# Patient Record
Sex: FEMALE | Race: OTHER | HISPANIC OR LATINO
[De-identification: names, ages, dates, MRNs, and addresses within clinical notes are randomized per-mention and may not be internally consistent; named-entity substitution may affect disease eponyms.]

---

## 2020-04-06 ENCOUNTER — APPOINTMENT (OUTPATIENT)
Dept: DISASTER EMERGENCY | Facility: CLINIC | Age: 40
End: 2020-04-06
Payer: COMMERCIAL

## 2020-04-06 VITALS
SYSTOLIC BLOOD PRESSURE: 108 MMHG | HEART RATE: 95 BPM | HEIGHT: 59 IN | BODY MASS INDEX: 29.23 KG/M2 | OXYGEN SATURATION: 98 % | RESPIRATION RATE: 16 BRPM | WEIGHT: 145 LBS | TEMPERATURE: 98.5 F | DIASTOLIC BLOOD PRESSURE: 70 MMHG

## 2020-04-06 DIAGNOSIS — R68.89 OTHER GENERAL SYMPTOMS AND SIGNS: ICD-10-CM

## 2020-04-06 DIAGNOSIS — Z20.828 CONTACT WITH AND (SUSPECTED) EXPOSURE TO OTHER VIRAL COMMUNICABLE DISEASES: ICD-10-CM

## 2020-04-06 PROCEDURE — 99202 OFFICE O/P NEW SF 15 MIN: CPT

## 2020-04-06 NOTE — HISTORY OF PRESENT ILLNESS
[Patient presents to the office today for COVID-19 evaluation and testing.] : Patient presents to the office today for COVID-19 evaluation and testing. [Patient has been pre-screened by RN at call center for appointment today with our facility.] : Patient has been pre-screened by RN at call center for appointment today with our facility. 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 Patient told to seek emergent care if symptoms occur. [FreeTextEntry1] : 38 y/o female  with sick contacts. She reports fever x 4 days, cough x 1 week, aches and pains that go to her back. No lightheadedness, dizziness, she can speak in full sentences. No sense of taste or smell. Diarrhea x 3 days. She is drinking fluids. Tylenol last taken at 10 a.m. today.

## 2020-04-07 LAB — SARS-COV-2 N GENE NPH QL NAA+PROBE: DETECTED

## 2021-08-04 ENCOUNTER — NON-APPOINTMENT (OUTPATIENT)
Age: 41
End: 2021-08-04

## 2021-08-04 ENCOUNTER — APPOINTMENT (OUTPATIENT)
Dept: INTERNAL MEDICINE | Facility: CLINIC | Age: 41
End: 2021-08-04
Payer: COMMERCIAL

## 2021-08-04 VITALS
RESPIRATION RATE: 16 BRPM | DIASTOLIC BLOOD PRESSURE: 78 MMHG | HEIGHT: 59 IN | TEMPERATURE: 97.5 F | OXYGEN SATURATION: 98 % | HEART RATE: 98 BPM | BODY MASS INDEX: 29.72 KG/M2 | SYSTOLIC BLOOD PRESSURE: 120 MMHG | WEIGHT: 147.4 LBS

## 2021-08-04 DIAGNOSIS — Z87.2 PERSONAL HISTORY OF DISEASES OF THE SKIN AND SUBCUTANEOUS TISSUE: ICD-10-CM

## 2021-08-04 DIAGNOSIS — Z80.42 FAMILY HISTORY OF MALIGNANT NEOPLASM OF PROSTATE: ICD-10-CM

## 2021-08-04 DIAGNOSIS — Z83.3 FAMILY HISTORY OF DIABETES MELLITUS: ICD-10-CM

## 2021-08-04 DIAGNOSIS — Z80.3 FAMILY HISTORY OF MALIGNANT NEOPLASM OF BREAST: ICD-10-CM

## 2021-08-04 PROCEDURE — 99386 PREV VISIT NEW AGE 40-64: CPT | Mod: 25

## 2021-08-04 PROCEDURE — G0442 ANNUAL ALCOHOL SCREEN 15 MIN: CPT

## 2021-08-04 PROCEDURE — 93000 ELECTROCARDIOGRAM COMPLETE: CPT | Mod: 59

## 2021-08-05 PROBLEM — Z87.2 HISTORY OF CYST OF BREAST: Status: RESOLVED | Noted: 2021-08-04 | Resolved: 2021-08-05

## 2021-08-05 PROBLEM — Z80.42 FAMILY HISTORY OF MALIGNANT NEOPLASM OF PROSTATE: Status: ACTIVE | Noted: 2021-08-04

## 2021-08-05 PROBLEM — Z80.3 FAMILY HISTORY OF MALIGNANT NEOPLASM OF BREAST: Status: ACTIVE | Noted: 2021-08-04

## 2021-08-05 PROBLEM — Z83.3 FAMILY HISTORY OF DIABETES MELLITUS: Status: ACTIVE | Noted: 2021-08-04

## 2021-08-05 NOTE — REVIEW OF SYSTEMS
[Negative] : Heme/Lymph [Itching] : Itching [Skin Rash] : skin rash [de-identified] : rash of lower extremities and back

## 2021-08-05 NOTE — HISTORY OF PRESENT ILLNESS
[FreeTextEntry1] : new patient annual wellness visit [de-identified] : New patient is a 40 year old female with a past medical history as below who presents for an annual wellness visit. Patient is not currently taking any prescription medications. Patient is taking OTC [place] which she states has helped to better regulate her menstrual cycles. Patient has not seen a GYN in the past year. She sees breast surgeon, Dr. Walker given history of breast cyst excision. She is up-to-date with annual breast imaging. Patient notes an itchy rash (lower extremities, back) that she first observed approximately 3 weeks ago. She denies using any new perfumes, creams, soaps, shampoos, or detergents. She denies recently purchasing new clothing. She notes seeing dermatologist, Dr. Pena who prescribed a topical cream that did not help alleviate the pruritus or rash. Patient did not receive the flu vaccine for this past season. She is unsure if she has received the Tetanus Vaccine in the past 10 years. She has received both doses of the COVID-19 Vaccine. Patient is not fasting today.

## 2021-08-05 NOTE — ADDENDUM
[FreeTextEntry1] : I, Mohsen Mclaughlin, acted solely as scribe for Dr. See Mead DO on this date 08/04/2021 12:40PM .\par \par All medical record entries made by the Scribe were at my, Dr. See Mead DO direction and personally dictated by me on 08/04/2021 12:40PM. I have reviewed the chart and agree that the record accurately reflects my personal performance of the history, physical exam, assessment and plan. I have also personally directed, reviewed and agreed with the chart.\par

## 2021-08-05 NOTE — PLAN
[FreeTextEntry1] : Gynecology\par Patient to be referred to GYN during next visit; Results of most recent breast imaging to be requested from [facility] \par history of breast cyst excision - continue to follow up with breast surgeon, Dr. Walker\par Dermatology\par dermatitis - start Prednisone 5mg p.o. as directed, Rx filled - recommended following up with dermatologist, Dr. Malik for further evaluation/treatment if rash persists/worsens after 1 week, consider checking Immunocap allergy panel with blood work up if rash still active\par Immunization\par flu vaccine - recommended following up between September and November 2021 for vaccine administration prior to start of next flu season\par \par check EKG (results as above)\par Patient to follow up for fasting blood work in 1 month.

## 2021-08-05 NOTE — ASSESSMENT
[FreeTextEntry1] : New patient is a 40 year old female with a past medical history as above who presents for an annual wellness visit.\par

## 2021-08-05 NOTE — HEALTH RISK ASSESSMENT
[No] : In the past 12 months have you used drugs other than those required for medical reasons? No [0] : 2) Feeling down, depressed, or hopeless: Not at all (0) [PHQ-2 Negative - No further assessment needed] : PHQ-2 Negative - No further assessment needed [] : No [Audit-CScore] : 0 [[id number]] : 0 [MammogramComments] : Results to be requested from [facility].  [PapSmearComments] : Patient to be referred to GYN during next visit.

## 2021-08-05 NOTE — PHYSICAL EXAM
[No Acute Distress] : no acute distress [Well Nourished] : well nourished [Well Developed] : well developed [Well-Appearing] : well-appearing [Normal Sclera/Conjunctiva] : normal sclera/conjunctiva [PERRL] : pupils equal round and reactive to light [EOMI] : extraocular movements intact [Normal Outer Ear/Nose] : the outer ears and nose were normal in appearance [Normal Oropharynx] : the oropharynx was normal [No JVD] : no jugular venous distention [No Lymphadenopathy] : no lymphadenopathy [Supple] : supple [Thyroid Normal, No Nodules] : the thyroid was normal and there were no nodules present [No Respiratory Distress] : no respiratory distress  [No Accessory Muscle Use] : no accessory muscle use [Clear to Auscultation] : lungs were clear to auscultation bilaterally [Normal Rate] : normal rate  [Regular Rhythm] : with a regular rhythm [Normal S1, S2] : normal S1 and S2 [No Murmur] : no murmur heard [No Carotid Bruits] : no carotid bruits [No Abdominal Bruit] : a ~M bruit was not heard ~T in the abdomen [No Varicosities] : no varicosities [Pedal Pulses Present] : the pedal pulses are present [No Edema] : there was no peripheral edema [No Palpable Aorta] : no palpable aorta [No Extremity Clubbing/Cyanosis] : no extremity clubbing/cyanosis [Soft] : abdomen soft [Non Tender] : non-tender [Non-distended] : non-distended [No Masses] : no abdominal mass palpated [No HSM] : no HSM [Normal Bowel Sounds] : normal bowel sounds [Normal Posterior Cervical Nodes] : no posterior cervical lymphadenopathy [Normal Anterior Cervical Nodes] : no anterior cervical lymphadenopathy [No CVA Tenderness] : no CVA  tenderness [No Spinal Tenderness] : no spinal tenderness [No Joint Swelling] : no joint swelling [Grossly Normal Strength/Tone] : grossly normal strength/tone [Coordination Grossly Intact] : coordination grossly intact [No Focal Deficits] : no focal deficits [Normal Gait] : normal gait [Deep Tendon Reflexes (DTR)] : deep tendon reflexes were 2+ and symmetric [Normal Affect] : the affect was normal [Normal Insight/Judgement] : insight and judgment were intact [de-identified] : overweight  [de-identified] : multiple 1 cm by 1 cm red papules on quads bilaterally, diffuse on back

## 2021-10-13 ENCOUNTER — APPOINTMENT (OUTPATIENT)
Dept: INTERNAL MEDICINE | Facility: CLINIC | Age: 41
End: 2021-10-13
Payer: COMMERCIAL

## 2021-10-13 VITALS
HEIGHT: 59 IN | RESPIRATION RATE: 16 BRPM | OXYGEN SATURATION: 83 % | HEART RATE: 98 BPM | WEIGHT: 147.5 LBS | DIASTOLIC BLOOD PRESSURE: 62 MMHG | BODY MASS INDEX: 29.73 KG/M2 | SYSTOLIC BLOOD PRESSURE: 100 MMHG | TEMPERATURE: 97.3 F

## 2021-10-13 DIAGNOSIS — L28.2 OTHER PRURIGO: ICD-10-CM

## 2021-10-13 DIAGNOSIS — L30.9 DERMATITIS, UNSPECIFIED: ICD-10-CM

## 2021-10-13 PROCEDURE — 36415 COLL VENOUS BLD VENIPUNCTURE: CPT

## 2021-10-13 PROCEDURE — 99213 OFFICE O/P EST LOW 20 MIN: CPT | Mod: 25

## 2021-10-13 PROCEDURE — 90686 IIV4 VACC NO PRSV 0.5 ML IM: CPT

## 2021-10-13 PROCEDURE — G0008: CPT

## 2021-10-13 NOTE — HEALTH RISK ASSESSMENT
[No] : In the past 12 months have you used drugs other than those required for medical reasons? No [0] : 2) Feeling down, depressed, or hopeless: Not at all (0) [PHQ-2 Negative - No further assessment needed] : PHQ-2 Negative - No further assessment needed [] : No [Audit-CScore] : 0 [[id number]] : 0 [MammogramComments] : Breast US: BI-RADS 2 - Benign findings.

## 2021-10-13 NOTE — PHYSICAL EXAM
[No Acute Distress] : no acute distress [Well Nourished] : well nourished [Well Developed] : well developed [Well-Appearing] : well-appearing [Normal Sclera/Conjunctiva] : normal sclera/conjunctiva [PERRL] : pupils equal round and reactive to light [EOMI] : extraocular movements intact [Normal Outer Ear/Nose] : the outer ears and nose were normal in appearance [Normal Oropharynx] : the oropharynx was normal [No JVD] : no jugular venous distention [No Lymphadenopathy] : no lymphadenopathy [Supple] : supple [Thyroid Normal, No Nodules] : the thyroid was normal and there were no nodules present [No Respiratory Distress] : no respiratory distress  [No Accessory Muscle Use] : no accessory muscle use [Clear to Auscultation] : lungs were clear to auscultation bilaterally [Normal Rate] : normal rate  [Regular Rhythm] : with a regular rhythm [Normal S1, S2] : normal S1 and S2 [No Murmur] : no murmur heard [No Carotid Bruits] : no carotid bruits [No Abdominal Bruit] : a ~M bruit was not heard ~T in the abdomen [No Varicosities] : no varicosities [Pedal Pulses Present] : the pedal pulses are present [No Edema] : there was no peripheral edema [No Palpable Aorta] : no palpable aorta [No Extremity Clubbing/Cyanosis] : no extremity clubbing/cyanosis [Soft] : abdomen soft [Non Tender] : non-tender [Non-distended] : non-distended [No Masses] : no abdominal mass palpated [No HSM] : no HSM [Normal Bowel Sounds] : normal bowel sounds [Normal Posterior Cervical Nodes] : no posterior cervical lymphadenopathy [Normal Anterior Cervical Nodes] : no anterior cervical lymphadenopathy [No CVA Tenderness] : no CVA  tenderness [No Spinal Tenderness] : no spinal tenderness [No Joint Swelling] : no joint swelling [Grossly Normal Strength/Tone] : grossly normal strength/tone [No Rash] : no rash [Coordination Grossly Intact] : coordination grossly intact [No Focal Deficits] : no focal deficits [Normal Gait] : normal gait [Deep Tendon Reflexes (DTR)] : deep tendon reflexes were 2+ and symmetric [Normal Affect] : the affect was normal [Normal Insight/Judgement] : insight and judgment were intact [de-identified] : overweight

## 2021-10-13 NOTE — HISTORY OF PRESENT ILLNESS
[FreeTextEntry1] : fasting blood work and general follow-up\par  [de-identified] : Patient is a 40 year old female with a past medical history as below who presents for fasting blood work and general follow-up. Blood work is being done today as patient was not fasting during her annual wellness visit on 8/4/21. Patient states rash resolved after completing course of oral Prednisone. Patient inquires about receiving the flu vaccine today. She has received both doses of the COVID-19 Vaccine (Pfizer).

## 2021-10-13 NOTE — PLAN
[FreeTextEntry1] : Gynecology\par history of breast cyst excision - continue to follow up with breast surgeon, Dr. Walker\par Immunization\par flu vaccine - Fluzone Quadrivalent 0.5mL x 1 administered intramuscularly to left deltoid \par \par check female panel and hemoglobin A1C

## 2021-10-13 NOTE — ADDENDUM
[FreeTextEntry1] : I, Mohsen Mclaughlin, acted solely as scribe for Dr. See Mead DO on this date 10/13/2021  8:50AM .\par \par All medical record entries made by the Scribe were at my, Dr. See Mead DO direction and personally dictated by me on 10/13/2021  8:50AM. I have reviewed the chart and agree that the record accurately reflects my personal performance of the history, physical exam, assessment and plan. I have also personally directed, reviewed and agreed with the chart.\par

## 2021-10-13 NOTE — ASSESSMENT
[FreeTextEntry1] : Patient is a 40 year old female with a past medical history as above who presents for fasting blood work and general follow-up.\par

## 2021-10-23 ENCOUNTER — NON-APPOINTMENT (OUTPATIENT)
Age: 41
End: 2021-10-23

## 2021-10-23 LAB
25(OH)D3 SERPL-MCNC: 18.1 NG/ML
ALBUMIN SERPL ELPH-MCNC: 4.4 G/DL
ALP BLD-CCNC: 124 U/L
ALT SERPL-CCNC: 32 U/L
ANION GAP SERPL CALC-SCNC: 14 MMOL/L
AST SERPL-CCNC: 29 U/L
BASOPHILS # BLD AUTO: 0.04 K/UL
BASOPHILS NFR BLD AUTO: 0.6 %
BILIRUB SERPL-MCNC: 0.5 MG/DL
BUN SERPL-MCNC: 14 MG/DL
CALCIUM SERPL-MCNC: 9.6 MG/DL
CHLORIDE SERPL-SCNC: 102 MMOL/L
CHOLEST SERPL-MCNC: 163 MG/DL
CO2 SERPL-SCNC: 24 MMOL/L
CREAT SERPL-MCNC: 0.55 MG/DL
EOSINOPHIL # BLD AUTO: 0.11 K/UL
EOSINOPHIL NFR BLD AUTO: 1.7 %
ESTIMATED AVERAGE GLUCOSE: 120 MG/DL
GLUCOSE SERPL-MCNC: 92 MG/DL
HBA1C MFR BLD HPLC: 5.8 %
HCT VFR BLD CALC: 42 %
HDLC SERPL-MCNC: 36 MG/DL
HGB BLD-MCNC: 12.8 G/DL
IMM GRANULOCYTES NFR BLD AUTO: 0.3 %
LDLC SERPL CALC-MCNC: 93 MG/DL
LYMPHOCYTES # BLD AUTO: 2.39 K/UL
LYMPHOCYTES NFR BLD AUTO: 37.2 %
MAN DIFF?: NORMAL
MCHC RBC-ENTMCNC: 26.6 PG
MCHC RBC-ENTMCNC: 30.5 GM/DL
MCV RBC AUTO: 87.1 FL
MONOCYTES # BLD AUTO: 0.47 K/UL
MONOCYTES NFR BLD AUTO: 7.3 %
NEUTROPHILS # BLD AUTO: 3.39 K/UL
NEUTROPHILS NFR BLD AUTO: 52.9 %
NONHDLC SERPL-MCNC: 127 MG/DL
PLATELET # BLD AUTO: 305 K/UL
POTASSIUM SERPL-SCNC: 4.2 MMOL/L
PROT SERPL-MCNC: 7.3 G/DL
RBC # BLD: 4.82 M/UL
RBC # FLD: 14.6 %
SODIUM SERPL-SCNC: 140 MMOL/L
TRIGL SERPL-MCNC: 167 MG/DL
TSH SERPL-ACNC: <0.01 UIU/ML
WBC # FLD AUTO: 6.42 K/UL

## 2021-11-15 ENCOUNTER — RESULT REVIEW (OUTPATIENT)
Age: 41
End: 2021-11-15

## 2021-11-15 ENCOUNTER — LABORATORY RESULT (OUTPATIENT)
Age: 41
End: 2021-11-15

## 2021-11-15 ENCOUNTER — APPOINTMENT (OUTPATIENT)
Dept: INTERNAL MEDICINE | Facility: CLINIC | Age: 41
End: 2021-11-15
Payer: COMMERCIAL

## 2021-11-15 VITALS
BODY MASS INDEX: 29.69 KG/M2 | SYSTOLIC BLOOD PRESSURE: 106 MMHG | TEMPERATURE: 98.1 F | DIASTOLIC BLOOD PRESSURE: 70 MMHG | WEIGHT: 147 LBS | OXYGEN SATURATION: 80 % | RESPIRATION RATE: 16 BRPM | HEART RATE: 98 BPM

## 2021-11-15 DIAGNOSIS — R74.8 ABNORMAL LEVELS OF OTHER SERUM ENZYMES: ICD-10-CM

## 2021-11-15 PROCEDURE — 36415 COLL VENOUS BLD VENIPUNCTURE: CPT

## 2021-11-15 PROCEDURE — 99214 OFFICE O/P EST MOD 30 MIN: CPT | Mod: 25

## 2021-11-15 NOTE — ADDENDUM
[FreeTextEntry1] : I, Mohsen Mclaughlin, acted solely as scribe for Dr. See Mead DO on this date 11/15/2021  3:20PM .\par \par All medical record entries made by the Scribe were at my, Dr. See Mead DO direction and personally dictated by me on 11/15/2021  3:20PM. I have reviewed the chart and agree that the record accurately reflects my personal performance of the history, physical exam, assessment and plan. I have also personally directed, reviewed and agreed with the chart.\par

## 2021-11-15 NOTE — REVIEW OF SYSTEMS
[Palpitations] : palpitations [Hair Changes] : hair changes [Negative] : Heme/Lymph [Joint Pain] : joint pain [FreeTextEntry9] : left wrist pain  [de-identified] : hair loss

## 2021-11-15 NOTE — PHYSICAL EXAM
[No Acute Distress] : no acute distress [Well Nourished] : well nourished [Well Developed] : well developed [Well-Appearing] : well-appearing [Normal Sclera/Conjunctiva] : normal sclera/conjunctiva [PERRL] : pupils equal round and reactive to light [EOMI] : extraocular movements intact [Normal Outer Ear/Nose] : the outer ears and nose were normal in appearance [Normal Oropharynx] : the oropharynx was normal [No JVD] : no jugular venous distention [No Lymphadenopathy] : no lymphadenopathy [Supple] : supple [Thyroid Normal, No Nodules] : the thyroid was normal and there were no nodules present [No Respiratory Distress] : no respiratory distress  [No Accessory Muscle Use] : no accessory muscle use [Clear to Auscultation] : lungs were clear to auscultation bilaterally [Normal Rate] : normal rate  [Regular Rhythm] : with a regular rhythm [Normal S1, S2] : normal S1 and S2 [No Murmur] : no murmur heard [No Carotid Bruits] : no carotid bruits [No Abdominal Bruit] : a ~M bruit was not heard ~T in the abdomen [No Varicosities] : no varicosities [Pedal Pulses Present] : the pedal pulses are present [No Edema] : there was no peripheral edema [No Palpable Aorta] : no palpable aorta [No Extremity Clubbing/Cyanosis] : no extremity clubbing/cyanosis [Soft] : abdomen soft [Non Tender] : non-tender [Non-distended] : non-distended [No Masses] : no abdominal mass palpated [No HSM] : no HSM [Normal Bowel Sounds] : normal bowel sounds [Normal Posterior Cervical Nodes] : no posterior cervical lymphadenopathy [Normal Anterior Cervical Nodes] : no anterior cervical lymphadenopathy [No CVA Tenderness] : no CVA  tenderness [No Spinal Tenderness] : no spinal tenderness [No Joint Swelling] : no joint swelling [No Rash] : no rash [Coordination Grossly Intact] : coordination grossly intact [No Focal Deficits] : no focal deficits [Normal Gait] : normal gait [Deep Tendon Reflexes (DTR)] : deep tendon reflexes were 2+ and symmetric [Normal Affect] : the affect was normal [Normal Insight/Judgement] : insight and judgment were intact [de-identified] : overweight  [de-identified] : tenderness over ulnar aspect of left wrist

## 2021-11-15 NOTE — PLAN
[FreeTextEntry1] : Gastroenterology\par elevated ALK PHOS - check ALK PHOS Isoenzyme Measure and Serum Gamma Glutamyl Transferase\par Endocrinology\par low TSH - possible secondary to hyperthyroidism - check thyroid panel; if TFTs confirm hyperthyroidism, will refer patient to endocrinology for further treatment \par hyperglycemia (prediabetes) - advised low carbohydrate/low sugar diet; again recommended increasing CV exercise - HGB A1C to be rechecked in 3 months\par Vitamin D deficiency - previously advised to start Vitamin D-3 4000 IU p.o.q.d. with meals for 1 month and then decrease dosage to 2000 IU daily thereafter - Vitamin D level to be rechecked in 3 months \par Cardiology\par mild hypertriglyceridemia - advised low cholesterol/low fat and low carbohydrate/low sugar diet - FLP to be rechecked in 3 months\par low HDL - again recommended increasing CV exercise - FLP to be rechecked in 3 months\par Gynecology\par history of breast cyst excision - continue to follow up with breast surgeon, Dr. Walker\par Musculoskeletal\par left wrist pain - Rx given for X-Ray Left Wrist; recommended following up at a [facility] facility\par \par check ALK PHOS Isoenzyme Measure, Serum Gamma Glutamyl Transferase, and thyroid panel\par Repeat fasting blood work to be done in 3 months.

## 2021-11-15 NOTE — ASSESSMENT
[FreeTextEntry1] : Patient is a 40 year old female with a past medical history as above who presents for non-fasting blood work and general follow-up.

## 2021-11-15 NOTE — HEALTH RISK ASSESSMENT
[No] : In the past 12 months have you used drugs other than those required for medical reasons? No [0] : 2) Feeling down, depressed, or hopeless: Not at all (0) [PHQ-2 Negative - No further assessment needed] : PHQ-2 Negative - No further assessment needed [] : No [Audit-CScore] : 0 [[id number]] : 0 [MammogramComments] : Breast US: BI-RADS 2 - Benign findings.

## 2021-11-15 NOTE — HISTORY OF PRESENT ILLNESS
[FreeTextEntry1] : non-fasting blood work and general follow-up [de-identified] : Patient is a 40 year old female with a past medical history as below who presents for non-fasting blood work and general follow-up. \par Blood work done on 10/13/21 revealed elevated ALK PHOS (124), low TSH (<0.01), elevated triglycerides (167), low HDL (36), elevated HGB A1C (5.8), and low Vitamin D (18.1). Patient has been taking OTC Vitamin D-3 daily. Patient intermittently notes palpitations. She has noted hair loss. She denies increased diaphoresis or diarrhea. Patient notes left wrist pain. She denies any recent falls or trauma to the area.

## 2021-11-21 ENCOUNTER — NON-APPOINTMENT (OUTPATIENT)
Age: 41
End: 2021-11-21

## 2021-11-21 LAB
ALP BLD-CCNC: 116 IU/L
ALP BONE CFR SERPL: 44 %
ALP INTEST CFR SERPL: 9 %
ALP LIVER CFR SERPL: 47 %
GGT SERPL-CCNC: 22 U/L
T3 SERPL-MCNC: 118 NG/DL
T3FREE SERPL-MCNC: 3.32 PG/ML
T3RU NFR SERPL: 1.1 TBI
T4 FREE SERPL-MCNC: 1 NG/DL
T4 SERPL-MCNC: 5.8 UG/DL
THYROGLOB AB SERPL-ACNC: 25.8 IU/ML
THYROPEROXIDASE AB SERPL IA-ACNC: 119 IU/ML
TSH SERPL-ACNC: 0.03 UIU/ML

## 2021-12-13 ENCOUNTER — APPOINTMENT (OUTPATIENT)
Dept: INTERNAL MEDICINE | Facility: CLINIC | Age: 41
End: 2021-12-13
Payer: COMMERCIAL

## 2021-12-13 VITALS
BODY MASS INDEX: 29.64 KG/M2 | RESPIRATION RATE: 16 BRPM | OXYGEN SATURATION: 98 % | DIASTOLIC BLOOD PRESSURE: 70 MMHG | HEIGHT: 59 IN | HEART RATE: 98 BPM | TEMPERATURE: 98.7 F | WEIGHT: 147 LBS | SYSTOLIC BLOOD PRESSURE: 110 MMHG

## 2021-12-13 PROCEDURE — 99213 OFFICE O/P EST LOW 20 MIN: CPT

## 2021-12-13 NOTE — PLAN
[FreeTextEntry1] : Musculoskeletal\par left wrist pain - Rx given for X-Ray Left Wrist; recommended following up at a [facility] facility - start Meloxicam 7.5mg BID p.o. p.r.n. with food as directed, Rx filled - referred to orthopedist, Dr. Barahona for further evaluation/treatment \par pain in thumb joint with movement of right hand - Rx given for X-Ray Right Hand; recommended following up at a [facility] facility - start Meloxicam 7.5mg BID p.o. p.r.n. with food as directed, Rx filled - referred to orthopedist, Dr. Barahona for further evaluation/treatment \par Endocrinology\par low TSH - possible secondary to hyperthyroidism - patient to see endocrinologist, Dr. Guzman on 2/15/22 \par hyperglycemia (prediabetes) - continue low carbohydrate/low sugar diet; previously recommended increasing CV exercise \par Vitamin D deficiency - previously advised to start Vitamin D-3 4000 IU p.o.q.d. with meals for 1 month and then decrease dosage to 2000 IU daily thereafter \par Cardiology\par mild hypertriglyceridemia - continue low cholesterol/low fat and low carbohydrate/low sugar diet \par low HDL - previously recommended increasing CV exercise \par Gynecology\par history of breast cyst excision - continue to follow up with breast surgeon, Dr. Walker\par \par Repeat fasting blood work to be done in 2 months.

## 2021-12-13 NOTE — HEALTH RISK ASSESSMENT
[Never] : Never [No] : In the past 12 months have you used drugs other than those required for medical reasons? No [0] : 2) Feeling down, depressed, or hopeless: Not at all (0) [PHQ-2 Negative - No further assessment needed] : PHQ-2 Negative - No further assessment needed [Audit-CScore] : 0 [[id number]] : 0 [MammogramComments] : Breast US (3/19/21): BI-RADS 2 - Benign findings.

## 2021-12-13 NOTE — REVIEW OF SYSTEMS
[Joint Pain] : joint pain [Negative] : Heme/Lymph [FreeTextEntry9] : left wrist pain; pain in right thumb joint with movement of right hand

## 2021-12-13 NOTE — ADDENDUM
[FreeTextEntry1] : I, Mohsen Mclaughlin, acted solely as scribe for Dr. See Mead DO on this date 12/13/2021 10:30AM .\par \par All medical record entries made by the Scribe were at my, Dr. See Mead DO direction and personally dictated by me on 12/13/2021 10:30AM. I have reviewed the chart and agree that the record accurately reflects my personal performance of the history, physical exam, assessment and plan. I have also personally directed, reviewed and agreed with the chart.\par

## 2021-12-13 NOTE — PHYSICAL EXAM
[No Acute Distress] : no acute distress [Well Nourished] : well nourished [Well Developed] : well developed [Well-Appearing] : well-appearing [Normal Sclera/Conjunctiva] : normal sclera/conjunctiva [PERRL] : pupils equal round and reactive to light [EOMI] : extraocular movements intact [Normal Outer Ear/Nose] : the outer ears and nose were normal in appearance [Normal Oropharynx] : the oropharynx was normal [No JVD] : no jugular venous distention [No Lymphadenopathy] : no lymphadenopathy [Supple] : supple [Thyroid Normal, No Nodules] : the thyroid was normal and there were no nodules present [No Respiratory Distress] : no respiratory distress  [No Accessory Muscle Use] : no accessory muscle use [Clear to Auscultation] : lungs were clear to auscultation bilaterally [Normal Rate] : normal rate  [Regular Rhythm] : with a regular rhythm [Normal S1, S2] : normal S1 and S2 [No Murmur] : no murmur heard [No Carotid Bruits] : no carotid bruits [No Abdominal Bruit] : a ~M bruit was not heard ~T in the abdomen [No Varicosities] : no varicosities [Pedal Pulses Present] : the pedal pulses are present [No Edema] : there was no peripheral edema [No Palpable Aorta] : no palpable aorta [No Extremity Clubbing/Cyanosis] : no extremity clubbing/cyanosis [Soft] : abdomen soft [Non Tender] : non-tender [Non-distended] : non-distended [No Masses] : no abdominal mass palpated [No HSM] : no HSM [Normal Bowel Sounds] : normal bowel sounds [Normal Posterior Cervical Nodes] : no posterior cervical lymphadenopathy [Normal Anterior Cervical Nodes] : no anterior cervical lymphadenopathy [No CVA Tenderness] : no CVA  tenderness [No Spinal Tenderness] : no spinal tenderness [No Joint Swelling] : no joint swelling [Grossly Normal Strength/Tone] : grossly normal strength/tone [No Rash] : no rash [Coordination Grossly Intact] : coordination grossly intact [No Focal Deficits] : no focal deficits [Normal Gait] : normal gait [Deep Tendon Reflexes (DTR)] : deep tendon reflexes were 2+ and symmetric [Normal Affect] : the affect was normal [Normal Insight/Judgement] : insight and judgment were intact [de-identified] : overweight

## 2021-12-13 NOTE — ASSESSMENT
[FreeTextEntry1] : Patient is a 40 year old female with a past medical history as above who presents for left wrist pain and pain in thumb joint with movement of right hand.

## 2021-12-13 NOTE — HISTORY OF PRESENT ILLNESS
[FreeTextEntry8] : Patient is a 40 year old female with a past medical history as below who presents for persistent left wrist pain which she first noted approximately 1 month ago. She denies any falls or trauma to the area prior to onset. Patient also notes pain in the right thumb joint with movement of her right hand. She denies any recent trauma to the area.\par

## 2021-12-21 ENCOUNTER — OUTPATIENT (OUTPATIENT)
Dept: OUTPATIENT SERVICES | Facility: HOSPITAL | Age: 41
LOS: 1 days | End: 2021-12-21
Payer: COMMERCIAL

## 2021-12-21 ENCOUNTER — APPOINTMENT (OUTPATIENT)
Dept: RADIOLOGY | Facility: CLINIC | Age: 41
End: 2021-12-21
Payer: COMMERCIAL

## 2021-12-21 DIAGNOSIS — M25.541 PAIN IN JOINTS OF RIGHT HAND: ICD-10-CM

## 2021-12-21 DIAGNOSIS — T14.90 INJURY, UNSPECIFIED: Chronic | ICD-10-CM

## 2021-12-21 PROCEDURE — 73130 X-RAY EXAM OF HAND: CPT | Mod: 26,RT

## 2021-12-21 PROCEDURE — 73110 X-RAY EXAM OF WRIST: CPT | Mod: 26,LT

## 2021-12-21 PROCEDURE — 73130 X-RAY EXAM OF HAND: CPT

## 2021-12-21 PROCEDURE — 73110 X-RAY EXAM OF WRIST: CPT

## 2022-01-05 ENCOUNTER — APPOINTMENT (OUTPATIENT)
Dept: ORTHOPEDIC SURGERY | Facility: CLINIC | Age: 42
End: 2022-01-05
Payer: COMMERCIAL

## 2022-01-05 ENCOUNTER — NON-APPOINTMENT (OUTPATIENT)
Age: 42
End: 2022-01-05

## 2022-01-05 VITALS
WEIGHT: 145 LBS | DIASTOLIC BLOOD PRESSURE: 67 MMHG | SYSTOLIC BLOOD PRESSURE: 102 MMHG | HEIGHT: 60 IN | HEART RATE: 90 BPM | BODY MASS INDEX: 28.47 KG/M2

## 2022-01-05 PROCEDURE — 20550 NJX 1 TENDON SHEATH/LIGAMENT: CPT | Mod: F5

## 2022-01-05 PROCEDURE — 99203 OFFICE O/P NEW LOW 30 MIN: CPT | Mod: 25

## 2022-01-05 RX ORDER — LIDOCAINE HYDROCHLORIDE 10 MG/ML
1 INJECTION, SOLUTION INFILTRATION; PERINEURAL
Refills: 0 | Status: COMPLETED | OUTPATIENT
Start: 2022-01-05

## 2022-01-05 RX ORDER — BETAMETHA AC,SOD PHOS/WATER/PF 6 MG/ML
6 (3-3) VIAL (ML) INJECTION
Qty: 1 | Refills: 0 | Status: COMPLETED | OUTPATIENT
Start: 2022-01-05

## 2022-01-05 RX ADMIN — LIDOCAINE HYDROCHLORIDE 0.5 %: 10 INJECTION, SOLUTION INFILTRATION; PERINEURAL at 00:00

## 2022-01-05 RX ADMIN — BETAMETHASONE SODIUM PHOSPHATE AND BETAMETHASONE ACETATE 1 MG/ML: 3; 3 INJECTION, SUSPENSION INTRA-ARTICULAR; INTRALESIONAL; INTRAMUSCULAR; SOFT TISSUE at 00:00

## 2022-01-05 NOTE — PHYSICAL EXAM
[de-identified] : - Constitutional: This is a female in no obvious distress.  \par - Psych: Patient is alert and oriented to person, place and time.  Patient has a normal mood and affect.\par - Cardiovascular: Normal pulses throughout the upper extremities.  No significant varicosities are noted in the upper extremities. \par - Neuro: Strength and sensation are intact throughout the upper extremities.  Patient has normal coordination.\par - Respiratory:  Patient exhibits no evidence of shortness of breath or difficulty breathing.\par - Skin: No rashes, lesions, or other abnormalities are noted in the upper extremities.\par \par --- \par \par Examination of her right thumb demonstrates swelling and tenderness along the A1 pulley per there is no obvious triggering, as she cannot flex to the point of triggering.  There is no triggering of the other digits.  She is neurovascularly intact distally.\par \par Examination of her left wrist demonstrates no obvious swelling.  She is tender dorsally and ulnarly.  There is mild tenderness along the ulnar styloid as well as along the ECU tendon.  There is no localized swelling or tenderness along the ulnocarpal joint.  There is no instability.  She is neurovascularly intact distally. [de-identified] : I reviewed radiographs of her right hand and left wrist from 12/21/21. These demonstrated no fractures or dislocations and preserved joint spaces and no joint margin erosions.

## 2022-01-05 NOTE — PROCEDURE
[FreeTextEntry1] : -  After a discussion of risks and benefits, the patient agreed to proceed with a cortisone injection.  \par -  Side: Right\par -  Finger: Thumb trigger finger\par -  Medications: 0.5 cc of 1% Lidocaine and 1 cc of betamethasone, using sterile technique.\par -  Patient tolerated the procedure well, without complications.\par -  Patient was told that the symptoms may worsen for a day or two, and should then begin to improve. \par -  Instructions: Patient was instructed on activity modification for the next several days.\par -  Follow-up: Within 4 weeks to assess response to the injection.

## 2022-01-05 NOTE — END OF VISIT
[FreeTextEntry3] : This note was written by Jailene Mobley on 01/05/2022 acting solely as a scribe for Dr. Jackson Barahona.\par  \par All medical record entries made by the Scribe were at my, Dr. Jackson Barahona, direction and personally dictated by me on 01/05/2022. I have personally reviewed the chart and agree that the record accurately reflects my personal performance of the history, physical exam, assessment and plan.

## 2022-01-05 NOTE — DISCUSSION/SUMMARY
[FreeTextEntry1] : She has findings consistent with right thumb trigger finger and left wrist pain secondary to either ECU tendinitis versus inflammation in the region of the DRUJ, dorsal ulnocarpal joint.\par \par I had a discussion with the patient regarding today's visit, the prognosis of this diagnosis, and treatment recommendations and options. With regard to the right thumb, she has agreed to proceed with a cortisone injection to the flexor tendon sheath.\par \par With regard to the left wrist, she was fitted with a left wrist splint.  We will reevaluate her progress when she returns in 4 weeks.\par \par She has agreed to the above plan of management and has expressed full understanding.  All questions were fully answered to their satisfaction. \par \par My cumulative time spent on this visit included: Preparation for the visit, review of the medical records, review of pertinent diagnostic studies, examination and counseling of the patient on the above diagnosis, treatment plan and prognosis, orders of diagnostic tests, medication and/or appropriate procedures and documentation in the medical records of today's visit.

## 2022-01-05 NOTE — HISTORY OF PRESENT ILLNESS
[Right] : right hand dominant [FreeTextEntry1] : She comes in today for evaluation of right thumb pain which began 2 months ago. She reports a loss of range of motion as well as locking to the thumb. She rates her pain as a 9 out of 10 at this time. She additionally complains of left wrist pain which began 3 months ago. At rest, she does not have pain but with movement her pain is as severe as a 9 out of 10.

## 2022-01-05 NOTE — ADDENDUM
[FreeTextEntry1] : I, Jailene Mobley, acted solely as a scribe for Dr. Barahona on this date on 01/05/2022.

## 2022-01-13 ENCOUNTER — NON-APPOINTMENT (OUTPATIENT)
Age: 42
End: 2022-01-13

## 2022-02-02 ENCOUNTER — APPOINTMENT (OUTPATIENT)
Dept: ORTHOPEDIC SURGERY | Facility: CLINIC | Age: 42
End: 2022-02-02
Payer: COMMERCIAL

## 2022-02-02 PROCEDURE — 99214 OFFICE O/P EST MOD 30 MIN: CPT | Mod: 25

## 2022-02-02 PROCEDURE — 20550 NJX 1 TENDON SHEATH/LIGAMENT: CPT | Mod: F5,RT

## 2022-02-02 RX ORDER — LIDOCAINE HYDROCHLORIDE 10 MG/ML
1 INJECTION, SOLUTION INFILTRATION; PERINEURAL
Refills: 0 | Status: COMPLETED | OUTPATIENT
Start: 2022-02-02

## 2022-02-02 RX ORDER — BETAMETHA AC,SOD PHOS/WATER/PF 6 MG/ML
6 (3-3) VIAL (ML) INJECTION
Qty: 1 | Refills: 0 | Status: COMPLETED | OUTPATIENT
Start: 2022-02-02

## 2022-02-02 RX ADMIN — BETAMETHASONE SODIUM PHOSPHATE AND BETAMETHASONE ACETATE 1 MG/ML: 3; 3 INJECTION, SUSPENSION INTRA-ARTICULAR; INTRALESIONAL; INTRAMUSCULAR; SOFT TISSUE at 00:00

## 2022-02-02 RX ADMIN — LIDOCAINE HYDROCHLORIDE 0.5 %: 10 INJECTION, SOLUTION INFILTRATION; PERINEURAL at 00:00

## 2022-02-02 NOTE — END OF VISIT
[FreeTextEntry3] : This note was written by Jailene Mobley on 02/02/2022 acting solely as a scribe for Dr. Jackson Barahona.\par  \par All medical record entries made by the Scribe were at my, Dr. Jackson Barahona, direction and personally dictated by me on 02/02/2022. I have personally reviewed the chart and agree that the record accurately reflects my personal performance of the history, physical exam, assessment and plan.

## 2022-02-02 NOTE — ADDENDUM
[FreeTextEntry1] : I, Jailene Mobley, acted solely as a scribe for Dr. Barahona on this date on 02/02/2022.

## 2022-02-02 NOTE — HISTORY OF PRESENT ILLNESS
[FreeTextEntry1] : Follow-up regarding thumb trigger finger and left wrist pain secondary to either ECU tendinitis versus inflammation in the region of the DRUJ / dorsal ulnocarpal joint\par \par She was seen in the office 4 weeks ago when she was given a cortisone injection at the right trigger thumb and she was fitted with a left wrist splint.\par \par She has some recurrence of clicking to the right thumb as of a few days ago. She rates her pain as a 0 out of 10 in this regard. With regard to the left wrist, she complains of increases in pain since her last visit.The brace did not help in controlling her pain. Her wrist pain is worse with rotation. She takes Tylenol with minimal relief. She rates her pan as a 10 out of 10 at this time with regard to the left wrist.

## 2022-02-02 NOTE — DISCUSSION/SUMMARY
MONITOR SUMMARY    .16/.08/.36    SR [id number]    Ectopy: Rare PVC, Rare PAC  
[FreeTextEntry1] : I had a discussion regarding today's visit, the diagnosis and treatment recommendations and options.  We also discussed changes since the last visit.  With regard to the left wrist, given her persistent symptoms, despite splinting and anti-inflammatory and activity modification, I recommended an MRI to evaluate the left wrist for ECU tendinitis versus TFCC tear. She will follow up after her MRI to review the results and discuss treatment recommendations. \par \par With regard to the right thumb, she has agreed to proceed with a repeat injection. She understands that this may not provide her with long term relief and if it does not I would recommend she return to the office to discuss further treatment recommendations. \par \par The patient has agreed to the above plan of management and has expressed full understanding.  All questions were fully answered to the patient's satisfaction.\par \par My cumulative time spent on today's visit was greater than 30 minutes and included: Preparation for the visit, review of the medical records, review of pertinent diagnostic studies, examination and counseling of the patient on the above diagnosis, treatment plan and prognosis, orders of diagnostic tests, medications and/or appropriate procedures and documentation in the medical records of today's visit.

## 2022-02-02 NOTE — PHYSICAL EXAM
[de-identified] : - Constitutional: This is a female in no obvious distress.  \par - Psych: Patient is alert and oriented to person, place and time.  Patient has a normal mood and affect.\par - Cardiovascular: Normal pulses throughout the upper extremities.  No significant varicosities are noted in the upper extremities. \par - Neuro: Strength and sensation are intact throughout the upper extremities.  Patient has normal coordination.\par - Respiratory:  Patient exhibits no evidence of shortness of breath or difficulty breathing.\par - Skin: No rashes, lesions, or other abnormalities are noted in the upper extremities.\par \par --- \par \par Examination of her right thumb demonstrates creased swelling and tenderness along the A1 pulley.  There is improved flexion.  There is mild triggering.  There is no triggering of the other digits.  She is neurovascularly intact distally.\par \par Examination of her left wrist demonstrates no obvious swelling.  She is tender dorsally and ulnarly.  There is mild tenderness along the ulnar styloid as well as along the ECU tendon.  She is also tender along the ulnocarpal joint in the region of the TFCC ligament.  There is no instability.  She is neurovascularly intact distally. [de-identified] : Radiographs of her right hand and left wrist from 12/21/21 demonstrated no fractures or dislocations and preserved joint spaces and no joint margin erosions.

## 2022-02-15 ENCOUNTER — APPOINTMENT (OUTPATIENT)
Dept: ENDOCRINOLOGY | Facility: CLINIC | Age: 42
End: 2022-02-15
Payer: COMMERCIAL

## 2022-02-15 VITALS
WEIGHT: 147 LBS | DIASTOLIC BLOOD PRESSURE: 69 MMHG | HEIGHT: 60 IN | BODY MASS INDEX: 28.86 KG/M2 | OXYGEN SATURATION: 99 % | SYSTOLIC BLOOD PRESSURE: 109 MMHG | HEART RATE: 76 BPM

## 2022-02-15 PROCEDURE — 36415 COLL VENOUS BLD VENIPUNCTURE: CPT

## 2022-02-15 PROCEDURE — 99204 OFFICE O/P NEW MOD 45 MIN: CPT | Mod: 25

## 2022-02-16 NOTE — HISTORY OF PRESENT ILLNESS
[FreeTextEntry1] : Ms. GALE HERNÁNDEZ is a 41 year old female  with a past medical history of HLD who presents for management of her thyroid disease. Patient was found to have a low TSH during her physical. She did notice that she has been having palpitations, tremors, irregular menses. She denies diarrhea, weight loss, or anxiety. Her sister has a thyroid condition. She denies any recent illnesses. She does notice some discomfort with swallowing. \par \par \par

## 2022-02-16 NOTE — CONSULT LETTER
[Dear  ___] : Dear  [unfilled], [Consult Letter:] : I had the pleasure of evaluating your patient, [unfilled]. [Please see my note below.] : Please see my note below. [Consult Closing:] : Thank you very much for allowing me to participate in the care of this patient.  If you have any questions, please do not hesitate to contact me. [Sincerely,] : Sincerely, [FreeTextEntry3] : Maia Guzman MS. DO.\par Endocrinology, Diabetes and Metabolism\par [address] [place] [address]\[place] [address]\par Tel [phone]\par Fax [phone]\par

## 2022-02-16 NOTE — ASSESSMENT
[FreeTextEntry1] : 41 year old female  with a past medical history of HLD who presents for management of her thyroid disease\par \par 1. Hyperthyroidism. \par DDx Graves, toxic MNG, or toxic adenoma\par Discussed and explained pathology and management\par Blood was drawn in the office today\par Will do more thyroid function tests\par Will check thyroid antibodies\par Will send for thyroid US\par May need a nuclear uptake scan \par Will decide after initial work up\par

## 2022-02-18 ENCOUNTER — APPOINTMENT (OUTPATIENT)
Dept: MRI IMAGING | Facility: CLINIC | Age: 42
End: 2022-02-18
Payer: COMMERCIAL

## 2022-02-18 ENCOUNTER — NON-APPOINTMENT (OUTPATIENT)
Age: 42
End: 2022-02-18

## 2022-02-18 ENCOUNTER — OUTPATIENT (OUTPATIENT)
Dept: OUTPATIENT SERVICES | Facility: HOSPITAL | Age: 42
LOS: 1 days | End: 2022-02-18
Payer: COMMERCIAL

## 2022-02-18 DIAGNOSIS — M77.8 OTHER ENTHESOPATHIES, NOT ELSEWHERE CLASSIFIED: ICD-10-CM

## 2022-02-18 DIAGNOSIS — T14.90 INJURY, UNSPECIFIED: Chronic | ICD-10-CM

## 2022-02-18 LAB
T3 SERPL-MCNC: 85 NG/DL
T4 FREE SERPL-MCNC: 1.1 NG/DL
TSH RECEPTOR AB: <1.1 IU/L
TSH SERPL-ACNC: 1.93 UIU/ML
TSI ACT/NOR SER: 0.39 IU/L

## 2022-02-18 PROCEDURE — 73221 MRI JOINT UPR EXTREM W/O DYE: CPT

## 2022-02-18 PROCEDURE — 73221 MRI JOINT UPR EXTREM W/O DYE: CPT | Mod: 26,LT

## 2022-02-22 ENCOUNTER — APPOINTMENT (OUTPATIENT)
Dept: ULTRASOUND IMAGING | Facility: CLINIC | Age: 42
End: 2022-02-22
Payer: COMMERCIAL

## 2022-02-22 ENCOUNTER — OUTPATIENT (OUTPATIENT)
Dept: OUTPATIENT SERVICES | Facility: HOSPITAL | Age: 42
LOS: 1 days | End: 2022-02-22
Payer: COMMERCIAL

## 2022-02-22 DIAGNOSIS — T14.90 INJURY, UNSPECIFIED: Chronic | ICD-10-CM

## 2022-02-22 DIAGNOSIS — R79.89 OTHER SPECIFIED ABNORMAL FINDINGS OF BLOOD CHEMISTRY: ICD-10-CM

## 2022-02-22 PROCEDURE — 76536 US EXAM OF HEAD AND NECK: CPT | Mod: 26

## 2022-02-22 PROCEDURE — 76536 US EXAM OF HEAD AND NECK: CPT

## 2022-03-01 ENCOUNTER — NON-APPOINTMENT (OUTPATIENT)
Age: 42
End: 2022-03-01

## 2022-03-04 ENCOUNTER — NON-APPOINTMENT (OUTPATIENT)
Age: 42
End: 2022-03-04

## 2022-03-09 ENCOUNTER — APPOINTMENT (OUTPATIENT)
Dept: ORTHOPEDIC SURGERY | Facility: CLINIC | Age: 42
End: 2022-03-09
Payer: COMMERCIAL

## 2022-03-09 PROCEDURE — 99214 OFFICE O/P EST MOD 30 MIN: CPT

## 2022-03-09 NOTE — END OF VISIT
[FreeTextEntry3] : This note was written by Jailene Mobley on 03/09/2022 acting solely as a scribe for Dr. Jackson Barahona.\par  \par All medical record entries made by the Scribe were at my, Dr. Jackson Barahona, direction and personally dictated by me on 03/09/2022. I have personally reviewed the chart and agree that the record accurately reflects my personal performance of the history, physical exam, assessment and plan.

## 2022-03-09 NOTE — DISCUSSION/SUMMARY
[FreeTextEntry1] : I had a discussion regarding today's visit, the diagnosis and treatment recommendations and options.  We also discussed changes since the last visit.  \par \par With regard to the left wrist, I reviewed the MRI results with her. Given the results of the MRI, and her persistent symptoms, I have recommended surgical management, particularly with regard to the ECU tendon. We did additionally discuss the option of physical therapy but I did tell them that this typically does not help in instances such as these. She has deferred physical therapy but is unsure of how she would like to proceed. She was provided with the information of our surgical scheduler and will call the office to schedule the procedure or to speak with me if she has any further questions. \par \par With regard to the right trigger thumb, as her symptoms are much improved and she has mild residual symptoms, I have recommended observation given the improvement of her symptoms. \par \par -  The nature and purposes of the operation/procedure was discussed in detail.  I discussed the surgical procedure in detail, as well as expected postoperative recovery and outcome.\par -  Possible risks, benefits, and complications (from known and unknown causes) of the procedure were discussed in detail.  \par -  Possible non-operative alternatives to the proposed treatment were discussed in detail.  \par -  She was told that possible risks/complications include, but are not limited to:  Infection, nerve or vessel injury, stiffness, painful scar, poor outcome, need for additional surgical procedures, and other unforeseen complications.  She understands that this can sometimes require a long period to recover from, sometimes up to 2 to 3 months.  She may require immobilization in a cast and/or splint for up to 4 weeks postoperatively if the ECU tendon needs to be stabilized.\par -  In addition, the possibility of an "unsuccessful outcome," despite "successful surgery," was discussed with the patient and her .  This was all discussed with them with a Czech-speaking .\par -  The patient fully understands these risks.  Again, she would like to think about this further and if she decides to proceed, she will call the office.\par -  I had a lengthy discussion with the patient regarding today's visit, the diagnosis, and my surgical treatment recommendations.  The patient and her  have expressed full understanding.  All questions were fully answered to their satisfaction.\par \par My cumulative time spent on today's visit was greater than 30 minutes and included: Preparation for the visit, review of the medical records, review of pertinent diagnostic studies, examination and counseling of the patient on the above diagnosis, treatment plan and prognosis, orders of diagnostic tests, medications and/or appropriate procedures and documentation in the medical records of today's visit.

## 2022-03-09 NOTE — ADDENDUM
[FreeTextEntry1] : I, Jailene Mobley, acted solely as a scribe for Dr. Barahona on this date on 03/09/2022.

## 2022-03-09 NOTE — HISTORY OF PRESENT ILLNESS
[FreeTextEntry1] : 5 weeks status post right trigger thumb cortisone injection #2.  She also has left ulnar-sided wrist pain.  Because of her persistent symptoms at the left wrist, I ordered an MRI when she was last seen in the office 5 weeks ago.  She comes in to review the results and discuss treatment recommendations.\par \par With regard to her right trigger thumb, the thumb still locks upon waking in the morning but she denies pain. With regard to the left wrist, her pain is worse than it was previously. Her pain is exacerbated with activity. She rates her pain as a 10 out of 10 at this time. \par \par She is accompanied by her  today. He aided in translation from Welsh to English. \par \par

## 2022-03-09 NOTE — PHYSICAL EXAM
[de-identified] : - Constitutional: This is a female in no obvious distress.  She is accompanied by her  today.\par - Psych: Patient is alert and oriented to person, place and time.  Patient has a normal mood and affect.\par - Cardiovascular: Normal pulses throughout the upper extremities.  No significant varicosities are noted in the upper extremities. \par - Neuro: Strength and sensation are intact throughout the upper extremities.  Patient has normal coordination.\par - Respiratory:  Patient exhibits no evidence of shortness of breath or difficulty breathing.\par - Skin: No rashes, lesions, or other abnormalities are noted in the upper extremities.\par \par --- \par \par Examination of her right thumb demonstrates no residual swelling tenderness along the A1 pulley.  There is improved flexion.  There is minimal triggering.  There is no triggering of the other digits.  She is neurovascularly intact distally.\par \par Examination of her left wrist demonstrates swelling ulnarly.  She is tender dorsally and ulnarly.  There is more notable tenderness along the ECU tendon.  She is also tender along the ulnocarpal joint in the region of the TFCC ligament.  There is no instability of the ECU tendon.  She remains neurovascularly intact distally. [de-identified] : I reviewed an MRI of her left wrist from 2/18/2022.  This demonstrated moderate to severe tendinosis and tenosynovitis of the ECU tendon.  Intrasubstance degeneration of the distal aspect of the styloid attachment of the TFCC ligament.  Mild enlargement of the median nerve consistent with carpal tunnel syndrome.\par \par Radiographs of her right hand and left wrist from 12/21/21 demonstrated no fractures or dislocations and preserved joint spaces and no joint margin erosions.

## 2022-05-16 ENCOUNTER — APPOINTMENT (OUTPATIENT)
Dept: ENDOCRINOLOGY | Facility: CLINIC | Age: 42
End: 2022-05-16

## 2022-05-18 ENCOUNTER — APPOINTMENT (OUTPATIENT)
Dept: INTERNAL MEDICINE | Facility: CLINIC | Age: 42
End: 2022-05-18
Payer: COMMERCIAL

## 2022-05-18 VITALS
HEART RATE: 78 BPM | TEMPERATURE: 98.7 F | RESPIRATION RATE: 16 BRPM | HEIGHT: 60 IN | OXYGEN SATURATION: 99 % | SYSTOLIC BLOOD PRESSURE: 110 MMHG | WEIGHT: 150 LBS | DIASTOLIC BLOOD PRESSURE: 70 MMHG | BODY MASS INDEX: 29.45 KG/M2

## 2022-05-18 PROCEDURE — 99214 OFFICE O/P EST MOD 30 MIN: CPT

## 2022-05-18 NOTE — HEALTH RISK ASSESSMENT
[Never] : Never [No] : In the past 12 months have you used drugs other than those required for medical reasons? No [0] : 2) Feeling down, depressed, or hopeless: Not at all (0) [PHQ-2 Negative - No further assessment needed] : PHQ-2 Negative - No further assessment needed [Audit-CScore] : 0 [[id number]] : 0 [MammogramComments] : Breast US (3/19/21): BI-RADS 2 - Benign findings.

## 2022-05-18 NOTE — ASSESSMENT
[FreeTextEntry1] : Patient is a 41 year old female with a past medical history as above who presents for epigastric pain.

## 2022-05-18 NOTE — PHYSICAL EXAM
[No Acute Distress] : no acute distress [Well Nourished] : well nourished [Well Developed] : well developed [Well-Appearing] : well-appearing [Normal Sclera/Conjunctiva] : normal sclera/conjunctiva [PERRL] : pupils equal round and reactive to light [EOMI] : extraocular movements intact [Normal Outer Ear/Nose] : the outer ears and nose were normal in appearance [Normal Oropharynx] : the oropharynx was normal [No JVD] : no jugular venous distention [No Lymphadenopathy] : no lymphadenopathy [Supple] : supple [Thyroid Normal, No Nodules] : the thyroid was normal and there were no nodules present [No Respiratory Distress] : no respiratory distress  [No Accessory Muscle Use] : no accessory muscle use [Clear to Auscultation] : lungs were clear to auscultation bilaterally [Normal Rate] : normal rate  [Regular Rhythm] : with a regular rhythm [Normal S1, S2] : normal S1 and S2 [No Murmur] : no murmur heard [No Carotid Bruits] : no carotid bruits [No Abdominal Bruit] : a ~M bruit was not heard ~T in the abdomen [No Varicosities] : no varicosities [Pedal Pulses Present] : the pedal pulses are present [No Edema] : there was no peripheral edema [No Palpable Aorta] : no palpable aorta [No Extremity Clubbing/Cyanosis] : no extremity clubbing/cyanosis [Soft] : abdomen soft [Non-distended] : non-distended [No Masses] : no abdominal mass palpated [No HSM] : no HSM [Normal Bowel Sounds] : normal bowel sounds [Normal Posterior Cervical Nodes] : no posterior cervical lymphadenopathy [Normal Anterior Cervical Nodes] : no anterior cervical lymphadenopathy [No CVA Tenderness] : no CVA  tenderness [No Spinal Tenderness] : no spinal tenderness [No Joint Swelling] : no joint swelling [Grossly Normal Strength/Tone] : grossly normal strength/tone [No Rash] : no rash [Coordination Grossly Intact] : coordination grossly intact [No Focal Deficits] : no focal deficits [Normal Gait] : normal gait [Deep Tendon Reflexes (DTR)] : deep tendon reflexes were 2+ and symmetric [Normal Affect] : the affect was normal [Normal Insight/Judgement] : insight and judgment were intact [de-identified] : epigastric tenderness to palpation   \par

## 2022-05-18 NOTE — HISTORY OF PRESENT ILLNESS
[Family Member] : family member [FreeTextEntry8] : Patient is a 41 year old female with a past medical history as below who presents for abdominal pain/bloating after eating. She first noticed the pain/bloating shortly after consuming pork approximately 1 week ago. She denies frequently consuming spicy foods. She notes visiting the [facility] ER on 5/11/22. She states work-up, including blood work and Abdominal US/X-Ray, was WNL. She was discharged the same day on Famotidine 20mg. She has been taking the medication daily for 1 week with improvement in symptoms. \par \par Patient has been seeing orthopedic hand specialist, Dr. Barahona regarding right thumb pain and left wrist pain. Patient is s/p cortisone injection to the flexor tendon sheath for right hand, 1st digit trigger finger. MRI Left Wrist dated 2/18/22 revealed moderate to severe tendinosis and tenosynovitis of the extensor carpi ulnaris tendon at the level of the wrist and carpus; also revealed intrasubstance degeneration of the distal aspect of the styloid attachment of the TFCC; also revealed mild enlargement of the median nerve at the level of the carpal tunnel, which can be seen in the setting of median neuropathy. Dr. Barahona recommended surgical management, particularly with regard to the ECU tendon, which the patient declined; patient also declined PT. She states the left wrist pain has improved.\par \par Since her last visit, she also saw endocrinologist, Dr. Guzman regarding low TSH and positive thyroid antibodies. Repeat TFTs dated 2/15/22 were WNL. Thyroid US dated 2/22/22 was WNL. Dr. Guzman recommended repeating TFTs after several months.

## 2022-05-18 NOTE — ADDENDUM
[FreeTextEntry1] : I, Mohsen Mclaughlin, acted solely as scribe for Dr. See Mead DO on this date 05/18/2022  9:00AM .\par \par All medical record entries made by the Scribe were at my, Dr. See Mead DO direction and personally dictated by me on 05/18/2022  9:00AM. I have reviewed the chart and agree that the record accurately reflects my personal performance of the history, physical exam, assessment and plan. I have also personally directed, reviewed and agreed with the chart.\par

## 2022-05-18 NOTE — REVIEW OF SYSTEMS
[Abdominal Pain] : abdominal pain [Negative] : Heme/Lymph [FreeTextEntry7] : abdominal pain/bloating after eating

## 2022-06-13 ENCOUNTER — APPOINTMENT (OUTPATIENT)
Dept: INTERNAL MEDICINE | Facility: CLINIC | Age: 42
End: 2022-06-13
Payer: COMMERCIAL

## 2022-06-13 ENCOUNTER — LABORATORY RESULT (OUTPATIENT)
Age: 42
End: 2022-06-13

## 2022-06-13 VITALS
RESPIRATION RATE: 14 BRPM | BODY MASS INDEX: 29.45 KG/M2 | SYSTOLIC BLOOD PRESSURE: 106 MMHG | HEART RATE: 67 BPM | TEMPERATURE: 98.4 F | WEIGHT: 150 LBS | HEIGHT: 60 IN | DIASTOLIC BLOOD PRESSURE: 72 MMHG | OXYGEN SATURATION: 99 %

## 2022-06-13 DIAGNOSIS — R31.29 OTHER MICROSCOPIC HEMATURIA: ICD-10-CM

## 2022-06-13 DIAGNOSIS — M25.572 PAIN IN LEFT ANKLE AND JOINTS OF LEFT FOOT: ICD-10-CM

## 2022-06-13 DIAGNOSIS — R10.13 EPIGASTRIC PAIN: ICD-10-CM

## 2022-06-13 DIAGNOSIS — R63.5 ABNORMAL WEIGHT GAIN: ICD-10-CM

## 2022-06-13 PROCEDURE — 99214 OFFICE O/P EST MOD 30 MIN: CPT | Mod: 25

## 2022-06-13 PROCEDURE — 36415 COLL VENOUS BLD VENIPUNCTURE: CPT

## 2022-06-13 RX ORDER — VALACYCLOVIR 1 G/1
1 TABLET, FILM COATED ORAL
Qty: 30 | Refills: 0 | Status: DISCONTINUED | COMMUNITY
Start: 2022-02-07

## 2022-06-13 NOTE — PHYSICAL EXAM
[No Acute Distress] : no acute distress [Well Nourished] : well nourished [Well Developed] : well developed [Well-Appearing] : well-appearing [Normal Sclera/Conjunctiva] : normal sclera/conjunctiva [PERRL] : pupils equal round and reactive to light [EOMI] : extraocular movements intact [Normal Outer Ear/Nose] : the outer ears and nose were normal in appearance [Normal Oropharynx] : the oropharynx was normal [No JVD] : no jugular venous distention [No Lymphadenopathy] : no lymphadenopathy [Supple] : supple [Thyroid Normal, No Nodules] : the thyroid was normal and there were no nodules present [No Respiratory Distress] : no respiratory distress  [No Accessory Muscle Use] : no accessory muscle use [Clear to Auscultation] : lungs were clear to auscultation bilaterally [Normal Rate] : normal rate  [Regular Rhythm] : with a regular rhythm [Normal S1, S2] : normal S1 and S2 [No Murmur] : no murmur heard [No Carotid Bruits] : no carotid bruits [No Abdominal Bruit] : a ~M bruit was not heard ~T in the abdomen [No Varicosities] : no varicosities [Pedal Pulses Present] : the pedal pulses are present [No Edema] : there was no peripheral edema [No Palpable Aorta] : no palpable aorta [No Extremity Clubbing/Cyanosis] : no extremity clubbing/cyanosis [Soft] : abdomen soft [Non Tender] : non-tender [Non-distended] : non-distended [No Masses] : no abdominal mass palpated [No HSM] : no HSM [Normal Bowel Sounds] : normal bowel sounds [Normal Posterior Cervical Nodes] : no posterior cervical lymphadenopathy [Normal Anterior Cervical Nodes] : no anterior cervical lymphadenopathy [No CVA Tenderness] : no CVA  tenderness [No Spinal Tenderness] : no spinal tenderness [No Joint Swelling] : no joint swelling [Grossly Normal Strength/Tone] : grossly normal strength/tone [No Rash] : no rash [Coordination Grossly Intact] : coordination grossly intact [No Focal Deficits] : no focal deficits [Normal Gait] : normal gait [Deep Tendon Reflexes (DTR)] : deep tendon reflexes were 2+ and symmetric [Normal Affect] : the affect was normal [Normal Insight/Judgement] : insight and judgment were intact [Normal Voice/Communication] : normal voice/communication [Normal TMs] : both tympanic membranes were normal [de-identified] : overweight  [de-identified] : right thumb trigger finger

## 2022-06-13 NOTE — PLAN
[FreeTextEntry1] : Gastroenterology\par epigastric pain - resolved; off Omeprazole 40mg for approximately 1 week - discussed antireflux measures - advised against spicy food consumption - advised against tomato/tomato product consumption - advised against citrus fruit/juice consumption - advised against peppermint/chocolate consumption - advised against caffeinated/carbonated beverage consumption - advised smaller, more frequent meals - advised sitting upright for 3 hours after meals - advised to follow up if the pain recurs \par Endocrinology\par Hashimoto's Disease/weight gain - check thyroid panel - follow up with endocrinologist, Dr. Guzman\par hyperglycemia (prediabetes) - advised low carbohydrate/low sugar diet; previously recommended increasing CV exercise \par Vitamin D deficiency - previously advised to start Vitamin D-3 4000 IU p.o.q.d. with a meal for 1 month and then decrease dosage to 2000 IU daily thereafter \par Cardiology\par mild hypertriglyceridemia - advised low cholesterol/low fat and low carbohydrate/low sugar diet \par low HDL - previously recommended increasing CV exercise \par Gynecology\par history of breast cyst excision - continue to follow up with breast surgeon, Dr. Walker\par Musculoskeletal\par right hand, 1st digit trigger finger - s/p cortisone injection - given complaint of right thumb pain, advised to follow up with orthopedist, Dr. Barahona for further treatment \par left wrist pain - follow up with orthopedist, Dr. Barahona as necessary \par left ankle pain - Rx given for X-Ray Left Ankle\par Urology\par microhematuria - check UA w/ Reflex Urine Culture\par \par check thyroid panel and UA w/ Reflex Urine Culture

## 2022-06-13 NOTE — HEALTH RISK ASSESSMENT
[Never] : Never [No] : In the past 12 months have you used drugs other than those required for medical reasons? No [0] : 2) Feeling down, depressed, or hopeless: Not at all (0) [PHQ-2 Negative - No further assessment needed] : PHQ-2 Negative - No further assessment needed [Audit-CScore] : 0 [[id number]] : 0 [MammogramComments] : Breast US (3/19/21): BI-RADS 2 - Benign findings.  Normal muscle tone/strength

## 2022-06-13 NOTE — HISTORY OF PRESENT ILLNESS
[FreeTextEntry1] : follow-up for epigastric pain  [de-identified] : Patient is a 41 year old female with a past medical history as below who presents for follow-up for epigastric pain. During her last visit, she had complained of abdominal pain/bloating after eating. She states the pain has resolved. She finished Rx for Omeprazole about 1 week ago; epigastric pain has not recurred. Patient had visited the [facility] ER on 5/11/22. Lab work done at [facility] was WNL except the following: elevated ALK PHOS (128); UA revealed small blood. CXR revealed no acute pulmonary disease. RUQ Abdominal US revealed hepatic steatosis, unremarkable gallbladder, and no biliary ductal dilatation. Abdominal X-Ray revealed non-obstructive bowel gas pattern and moderate colonic stool burden with no evidence of free air.\par  \par Patient has been seeing orthopedic hand specialist, Dr. Barahona regarding right thumb pain and left wrist pain. Patient is s/p cortisone injection to the flexor tendon sheath for right hand, 1st digit trigger finger. MRI Left Wrist dated 2/18/22 revealed moderate to severe tendinosis and tenosynovitis of the extensor carpi ulnaris tendon at the level of the wrist and carpus; also revealed intrasubstance degeneration of the distal aspect of the styloid attachment of the TFCC; also revealed mild enlargement of the median nerve at the level of the carpal tunnel, which can be seen in the setting of median neuropathy. Dr. Barahona recommended surgical management, particularly with regard to the ECU tendon, which the patient declined; patient also declined PT. She states the right thumb pain has recurred. \par \par Patient intermittently notes left ankle area pain with associated erythema/warmth. She states the area has also "felt hard". She denies any prior injury/trauma.\par \par Patient notes some weight gain despite eating less.

## 2022-06-13 NOTE — REVIEW OF SYSTEMS
[Negative] : Heme/Lymph [Recent Change In Weight] : ~T recent weight change [FreeTextEntry2] : weight gain [FreeTextEntry9] : left ankle pain with associated erythema/warmth

## 2022-06-13 NOTE — ASSESSMENT
[FreeTextEntry1] : Patient is a 41 year old female with a past medical history as above who presents for follow-up for epigastric pain.

## 2022-06-13 NOTE — ADDENDUM
[FreeTextEntry1] : I, Mohsen Mclaughlin, acted solely as scribe for Dr. See Mead DO on this date 06/13/2022 11:20AM .\par \par All medical record entries made by the Scribe were at my, Dr. See Mead DO direction and personally dictated by me on 06/13/2022 11:20AM. I have reviewed the chart and agree that the record accurately reflects my personal performance of the history, physical exam, assessment and plan. I have also personally directed, reviewed and agreed with the chart.

## 2022-06-15 LAB
APPEARANCE: CLEAR
BILIRUBIN URINE: NEGATIVE
BLOOD URINE: NEGATIVE
COLOR: COLORLESS
GLUCOSE QUALITATIVE U: NEGATIVE
KETONES URINE: NEGATIVE
LEUKOCYTE ESTERASE URINE: NEGATIVE
NITRITE URINE: NEGATIVE
PH URINE: 6
PROTEIN URINE: NEGATIVE
SPECIFIC GRAVITY URINE: 1.01
T3 SERPL-MCNC: 105 NG/DL
T3FREE SERPL-MCNC: 2.66 PG/ML
T3RU NFR SERPL: 1.1 TBI
T4 FREE SERPL-MCNC: 1 NG/DL
T4 SERPL-MCNC: 6.6 UG/DL
THYROGLOB AB SERPL-ACNC: <20 IU/ML
THYROPEROXIDASE AB SERPL IA-ACNC: 16.4 IU/ML
TSH SERPL-ACNC: 2.4 UIU/ML
UROBILINOGEN URINE: NORMAL

## 2022-06-22 ENCOUNTER — APPOINTMENT (OUTPATIENT)
Dept: ORTHOPEDIC SURGERY | Facility: CLINIC | Age: 42
End: 2022-06-22
Payer: COMMERCIAL

## 2022-06-22 PROCEDURE — 99214 OFFICE O/P EST MOD 30 MIN: CPT

## 2022-06-22 NOTE — HISTORY OF PRESENT ILLNESS
[FreeTextEntry1] : 4-1/2 months status post right trigger thumb cortisone injection #2.  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  We discussed surgery for her left wrist.\par \par She returns today with recurrent complaints of right thumb pain and triggering. The finger locks intermittently. She rates her pain as anywhere from a 5 to 10 out of 10. With regard to the left wrist, she reports to be improved since her last office visit. There is decreased swelling. She rates her pain as a 5 out of 10. \par \par Of note, she reports to have received an injection for pain recently in [place]. It was not given at the left wrist however. \par \par She is accompanied by her son today. He aided in translation from [place] to English. \par \par

## 2022-06-22 NOTE — END OF VISIT
[FreeTextEntry3] : This note was written by Jailene Mobley on 06/22/2022 acting solely as a scribe for Dr. Jackson Barahona.\par  \par All medical record entries made by the Scribe were at my, Dr. Jackson Barahona, direction and personally dictated by me on 06/22/2022. I have personally reviewed the chart and agree that the record accurately reflects my personal performance of the history, physical exam, assessment and plan.

## 2022-06-22 NOTE — DISCUSSION/SUMMARY
[FreeTextEntry1] : I had a discussion regarding today's visit, the diagnosis and treatment recommendations and options.  We also discussed changes since the last visit.  With regard to the left wrist, as she is improved, I have recommended observation. She will follow up as needed, according to her symptoms. \par \par With regard to the right thumb, as she has had 2 prior cortisone injections with recurrent symptoms, I recommended operative management of surgical release. She has agreed to proceed and will meet with our surgical scheduler to be scheduled for some time in the near future.\par \par -  The nature and purposes of the operation/procedure was discussed in detail.  I discussed the surgical procedure in detail, as well as expected postoperative recovery and outcome.\par -  Possible risks, benefits, and complications (from known and unknown causes) of the procedure were discussed in detail.  \par -  Possible non-operative alternatives to the proposed treatment were discussed in detail.  \par -  She was told that possible risks/complications include, but are not limited to:  Infection, digital nerve or vessel injury, stiffness, painful scar, poor outcome, need for additional surgical procedures, and other unforeseen complications.  \par -  In addition, the possibility of an "unsuccessful outcome," despite "successful surgery," was discussed with the patient.  \par -  The patient fully understands these risks and wishes to proceed.  \par -  I had a lengthy discussion with the patient regarding today's visit, the diagnosis, and my surgical treatment recommendations.  The patient has agreed to this plan of management and has expressed full understanding.  All questions were fully answered to the patient's satisfaction. 	\par \par My cumulative time spent on today's visit was greater than 30 minutes and included: Preparation for the visit, review of the medical records, review of pertinent diagnostic studies, examination and counseling of the patient on the above diagnosis, treatment plan and prognosis, orders of diagnostic tests, medications and/or appropriate procedures and documentation in the medical records of today's visit.

## 2022-06-22 NOTE — ADDENDUM
[FreeTextEntry1] : I, Jailene Mobley, acted solely as a scribe for Dr. Barahona on this date on 06/22/2022.

## 2022-06-22 NOTE — PHYSICAL EXAM
[de-identified] : - Constitutional: This is a female in no obvious distress.  She is accompanied by her son today.\par - Psych: Patient is alert and oriented to person, place and time.  Patient has a normal mood and affect.\par - Cardiovascular: Normal pulses throughout the upper extremities.  No significant varicosities are noted in the upper extremities. \par - Neuro: Strength and sensation are intact throughout the upper extremities.  Patient has normal coordination.\par - Respiratory:  Patient exhibits no evidence of shortness of breath or difficulty breathing.\par - Skin: No rashes, lesions, or other abnormalities are noted in the upper extremities.\par \par --- \par \par Examination of her right thumb demonstrates swelling and tenderness along the A1 pulley.  There is no triggering, as she cannot flex to the point of triggering.  There is no triggering of the other digits.  She remains neurovascularly intact distally.\par \par Examination of her left wrist demonstrates no residual swelling ulnarly.  She is no longer tender along the ulnocarpal joint.  There is no residual tenderness along the ECU tendon.  There is no instability of the ECU tendon.  She remains neurovascularly intact distally. [de-identified] : An MRI of her left wrist from 2/18/2022 demonstrated moderate to severe tendinosis and tenosynovitis of the ECU tendon.  Intrasubstance degeneration of the distal aspect of the styloid attachment of the TFCC ligament.  Mild enlargement of the median nerve consistent with carpal tunnel syndrome.\par \par Radiographs of her right hand and left wrist from 12/21/21 demonstrated no fractures or dislocations and preserved joint spaces and no joint margin erosions.

## 2022-06-27 ENCOUNTER — OUTPATIENT (OUTPATIENT)
Dept: OUTPATIENT SERVICES | Facility: HOSPITAL | Age: 42
LOS: 1 days | End: 2022-06-27
Payer: COMMERCIAL

## 2022-06-27 VITALS
RESPIRATION RATE: 16 BRPM | DIASTOLIC BLOOD PRESSURE: 74 MMHG | HEART RATE: 68 BPM | SYSTOLIC BLOOD PRESSURE: 106 MMHG | WEIGHT: 149.91 LBS | HEIGHT: 59.5 IN | TEMPERATURE: 97 F | OXYGEN SATURATION: 97 %

## 2022-06-27 DIAGNOSIS — Z98.890 OTHER SPECIFIED POSTPROCEDURAL STATES: Chronic | ICD-10-CM

## 2022-06-27 DIAGNOSIS — Z98.891 HISTORY OF UTERINE SCAR FROM PREVIOUS SURGERY: Chronic | ICD-10-CM

## 2022-06-27 DIAGNOSIS — T14.90 INJURY, UNSPECIFIED: Chronic | ICD-10-CM

## 2022-06-27 DIAGNOSIS — M65.311 TRIGGER THUMB, RIGHT THUMB: ICD-10-CM

## 2022-06-27 DIAGNOSIS — Z01.818 ENCOUNTER FOR OTHER PREPROCEDURAL EXAMINATION: ICD-10-CM

## 2022-06-27 LAB
HCG SERPL-ACNC: <1 MIU/ML — SIGNIFICANT CHANGE UP
HCT VFR BLD CALC: 42.1 % — SIGNIFICANT CHANGE UP (ref 34.5–45)
HGB BLD-MCNC: 13.9 G/DL — SIGNIFICANT CHANGE UP (ref 11.5–15.5)
MCHC RBC-ENTMCNC: 28.5 PG — SIGNIFICANT CHANGE UP (ref 27–34)
MCHC RBC-ENTMCNC: 33 GM/DL — SIGNIFICANT CHANGE UP (ref 32–36)
MCV RBC AUTO: 86.3 FL — SIGNIFICANT CHANGE UP (ref 80–100)
NRBC # BLD: 0 /100 WBCS — SIGNIFICANT CHANGE UP (ref 0–0)
PLATELET # BLD AUTO: 283 K/UL — SIGNIFICANT CHANGE UP (ref 150–400)
RBC # BLD: 4.88 M/UL — SIGNIFICANT CHANGE UP (ref 3.8–5.2)
RBC # FLD: 13.8 % — SIGNIFICANT CHANGE UP (ref 10.3–14.5)
WBC # BLD: 10.41 K/UL — SIGNIFICANT CHANGE UP (ref 3.8–10.5)
WBC # FLD AUTO: 10.41 K/UL — SIGNIFICANT CHANGE UP (ref 3.8–10.5)

## 2022-06-27 PROCEDURE — 36415 COLL VENOUS BLD VENIPUNCTURE: CPT

## 2022-06-27 PROCEDURE — 84702 CHORIONIC GONADOTROPIN TEST: CPT

## 2022-06-27 PROCEDURE — G0463: CPT

## 2022-06-27 PROCEDURE — 85027 COMPLETE CBC AUTOMATED: CPT

## 2022-06-27 NOTE — H&P PST ADULT - MUSCULOSKELETAL
right thumb/no calf tenderness/decreased ROM due to pain/strength 5/5 bilateral lower extremities/decreased strength details…

## 2022-06-27 NOTE — H&P PST ADULT - NSICDXPASTSURGICALHX_GEN_ALL_CORE_FT
PAST SURGICAL HISTORY:  Injury left index finger & had surgery ( 13 YEARS ago )     PAST SURGICAL HISTORY:  H/O breast surgery right breast cyst removed    H/O foot surgery bone spur left foot    H/O:      History of dilatation and curettage     Injury left index finger & had surgery ( 13 YEARS ago )

## 2022-06-27 NOTE — H&P PST ADULT - HISTORY OF PRESENT ILLNESS
42 yo female reports  8 months history of stiffnes 40 yo female reports  8 months history of pain and stiffness of right thumb.  Patient states that pain has increased over the past several months.  No current analgesics.

## 2022-06-27 NOTE — H&P PST ADULT - ASSESSMENT
Pre op instructions reviewed in [place]ian and understood.  Printed instructions  provided in [place]ian.

## 2022-06-27 NOTE — H&P PST ADULT - NSICDXPASTMEDICALHX_GEN_ALL_CORE_FT
PAST MEDICAL HISTORY:  NO PERTINENT PAST MEDICAL HISTORY      PAST MEDICAL HISTORY:  2019 novel coronavirus disease (COVID-19) 2020, no pneumonia

## 2022-06-28 ENCOUNTER — NON-APPOINTMENT (OUTPATIENT)
Age: 42
End: 2022-06-28

## 2022-07-03 ENCOUNTER — OUTPATIENT (OUTPATIENT)
Dept: OUTPATIENT SERVICES | Facility: HOSPITAL | Age: 42
LOS: 1 days | End: 2022-07-03
Payer: COMMERCIAL

## 2022-07-03 DIAGNOSIS — Z98.891 HISTORY OF UTERINE SCAR FROM PREVIOUS SURGERY: Chronic | ICD-10-CM

## 2022-07-03 DIAGNOSIS — Z98.890 OTHER SPECIFIED POSTPROCEDURAL STATES: Chronic | ICD-10-CM

## 2022-07-03 DIAGNOSIS — T14.90 INJURY, UNSPECIFIED: Chronic | ICD-10-CM

## 2022-07-03 DIAGNOSIS — Z20.828 CONTACT WITH AND (SUSPECTED) EXPOSURE TO OTHER VIRAL COMMUNICABLE DISEASES: ICD-10-CM

## 2022-07-03 LAB — SARS-COV-2 RNA SPEC QL NAA+PROBE: SIGNIFICANT CHANGE UP

## 2022-07-03 PROCEDURE — U0005: CPT

## 2022-07-03 PROCEDURE — U0003: CPT

## 2022-07-04 ENCOUNTER — TRANSCRIPTION ENCOUNTER (OUTPATIENT)
Age: 42
End: 2022-07-04

## 2022-07-04 ENCOUNTER — NON-APPOINTMENT (OUTPATIENT)
Age: 42
End: 2022-07-04

## 2022-07-05 ENCOUNTER — TRANSCRIPTION ENCOUNTER (OUTPATIENT)
Age: 42
End: 2022-07-05

## 2022-07-05 ENCOUNTER — APPOINTMENT (OUTPATIENT)
Dept: ORTHOPEDIC SURGERY | Facility: HOSPITAL | Age: 42
End: 2022-07-05

## 2022-07-05 ENCOUNTER — OUTPATIENT (OUTPATIENT)
Dept: OUTPATIENT SERVICES | Facility: HOSPITAL | Age: 42
LOS: 1 days | End: 2022-07-05
Payer: COMMERCIAL

## 2022-07-05 VITALS
OXYGEN SATURATION: 98 % | RESPIRATION RATE: 18 BRPM | SYSTOLIC BLOOD PRESSURE: 107 MMHG | HEART RATE: 68 BPM | TEMPERATURE: 98 F | WEIGHT: 152.12 LBS | HEIGHT: 59 IN | DIASTOLIC BLOOD PRESSURE: 67 MMHG

## 2022-07-05 VITALS — HEART RATE: 64 BPM | RESPIRATION RATE: 14 BRPM | OXYGEN SATURATION: 100 %

## 2022-07-05 DIAGNOSIS — Z98.890 OTHER SPECIFIED POSTPROCEDURAL STATES: Chronic | ICD-10-CM

## 2022-07-05 DIAGNOSIS — T14.90 INJURY, UNSPECIFIED: Chronic | ICD-10-CM

## 2022-07-05 DIAGNOSIS — Z98.891 HISTORY OF UTERINE SCAR FROM PREVIOUS SURGERY: Chronic | ICD-10-CM

## 2022-07-05 DIAGNOSIS — M65.311 TRIGGER THUMB, RIGHT THUMB: ICD-10-CM

## 2022-07-05 LAB — HCG UR QL: NEGATIVE — SIGNIFICANT CHANGE UP

## 2022-07-05 PROCEDURE — 81025 URINE PREGNANCY TEST: CPT

## 2022-07-05 PROCEDURE — 26055 INCISE FINGER TENDON SHEATH: CPT | Mod: F5

## 2022-07-05 RX ORDER — CEFAZOLIN SODIUM 1 G
2000 VIAL (EA) INJECTION ONCE
Refills: 0 | Status: COMPLETED | OUTPATIENT
Start: 2022-07-05 | End: 2022-07-05

## 2022-07-05 RX ORDER — CHLORHEXIDINE GLUCONATE 213 G/1000ML
1 SOLUTION TOPICAL ONCE
Refills: 0 | Status: COMPLETED | OUTPATIENT
Start: 2022-07-05 | End: 2022-07-05

## 2022-07-05 RX ORDER — IBUPROFEN 200 MG
1 TABLET ORAL
Qty: 10 | Refills: 0
Start: 2022-07-05

## 2022-07-05 RX ORDER — SODIUM CHLORIDE 9 MG/ML
1000 INJECTION, SOLUTION INTRAVENOUS
Refills: 0 | Status: DISCONTINUED | OUTPATIENT
Start: 2022-07-05 | End: 2022-07-05

## 2022-07-05 RX ORDER — ONDANSETRON 8 MG/1
4 TABLET, FILM COATED ORAL ONCE
Refills: 0 | Status: DISCONTINUED | OUTPATIENT
Start: 2022-07-05 | End: 2022-07-05

## 2022-07-05 RX ORDER — HYDROMORPHONE HYDROCHLORIDE 2 MG/ML
0.5 INJECTION INTRAMUSCULAR; INTRAVENOUS; SUBCUTANEOUS
Refills: 0 | Status: DISCONTINUED | OUTPATIENT
Start: 2022-07-05 | End: 2022-07-05

## 2022-07-05 RX ADMIN — CHLORHEXIDINE GLUCONATE 1 APPLICATION(S): 213 SOLUTION TOPICAL at 07:20

## 2022-07-05 NOTE — ASU PATIENT PROFILE, ADULT - NSICDXPASTSURGICALHX_GEN_ALL_CORE_FT
PAST SURGICAL HISTORY:  H/O breast surgery right breast cyst removed    H/O foot surgery bone spur left foot    H/O:      History of dilatation and curettage     Injury left index finger & had surgery ( 13 YEARS ago )

## 2022-07-05 NOTE — ASU DISCHARGE PLAN (ADULT/PEDIATRIC) - NS MD DC FALL RISK RISK
For information on Fall & Injury Prevention, visit: [email or web address] OR  [email or web address] OR  https://www.cdc.gov/steadi/patient.html

## 2022-07-05 NOTE — ASU DISCHARGE PLAN (ADULT/PEDIATRIC) - CARE PROVIDER_API CALL
Jackson Barahona)  Orthopaedic Surgery; Surgery of the Hand  [address], [address]  [place] [address]  Phone: [phone]  Fax: [phone]  Follow Up Time:

## 2022-07-05 NOTE — ASU DISCHARGE PLAN (ADULT/PEDIATRIC) - ASU DC SPECIAL INSTRUCTIONSFT
- Call your doctor if you experience:  • An increase in pain not controlled by pain medication or change in activity or  position.  • Temperature greater than 101° F.  • Redness, increased swelling or foul smelling drainage from or around the  incision.  • Numbness, tingling or a change in color or temperature of the operative extremity.  • Call your doctor immediately if you experience chest pain, shortness of breath or calf pain.     Follow all verbal and written instructions. Take medications as prescribed. DO NOT drive, operate machinery, and/or make important decisions while on prescription pain medication. DO NOT hesitate to call Doctor's office with questions or concerns. Change Dressing on Saturday 7/9/22.  - Call your doctor if you experience:  • An increase in pain not controlled by pain medication or change in activity or  position.  • Temperature greater than 101° F.  • Redness, increased swelling or foul smelling drainage from or around the  incision.  • Numbness, tingling or a change in color or temperature of the operative extremity.  • Call your doctor immediately if you experience chest pain, shortness of breath or calf pain.     Follow all verbal and written instructions. Take medications as prescribed. DO NOT drive, operate machinery, and/or make important decisions while on prescription pain medication. DO NOT hesitate to call Doctor's office with questions or concerns.

## 2022-07-07 PROBLEM — U07.1 COVID-19: Chronic | Status: ACTIVE | Noted: 2022-06-27

## 2022-07-09 PROBLEM — M65.311 TRIGGER FINGER OF RIGHT THUMB: Status: ACTIVE | Noted: 2022-01-05

## 2022-07-15 ENCOUNTER — APPOINTMENT (OUTPATIENT)
Dept: ORTHOPEDIC SURGERY | Facility: CLINIC | Age: 42
End: 2022-07-15

## 2022-07-15 DIAGNOSIS — M65.311 TRIGGER THUMB, RIGHT THUMB: ICD-10-CM

## 2022-07-15 PROCEDURE — 99024 POSTOP FOLLOW-UP VISIT: CPT

## 2022-07-15 NOTE — END OF VISIT
[FreeTextEntry3] : This note was written by Jailene Mobley on 07/15/2022 acting solely as a scribe for Dr. Jackson Barahona.\par  \par All medical record entries made by the Scribe were at my, Dr. Jackson Barahona, direction and personally dictated by me on 07/15/2022. I have personally reviewed the chart and agree that the record accurately reflects my personal performance of the history, physical exam, assessment and plan.

## 2022-07-15 NOTE — ADDENDUM
[FreeTextEntry1] : I, Jailene Mobley, acted solely as a scribe for Dr. Barahona on this date on 07/15/2022.

## 2022-07-15 NOTE — HISTORY OF PRESENT ILLNESS
[de-identified] : 10 days postoperative. [de-identified] : 10 days status post right trigger thumb release.  Date of surgery: 7/5/2022.\par \par She reports mild postoperative pain. She denies numbness and tingling. She is taking Ibuprofen for pain as needed. She rates her pain as a 3 out of 10 at this time. \par \par She is accompanied by her  today. [de-identified] : Examination of her right thumb after the dressing was removed demonstrates her incision to be clean and dry.  There is no drainage or evidence of an infection.  She has near full flexion and extension.  She is neurovascularly intact distally. [de-identified] : Stable, 10 days postoperative. [de-identified] : The sutures were removed and Steri-Strips were applied.  She was instructed on local wound care, when to begin scar massage and desensitization, range of motion exercises and will followup in 3 weeks.  The patient was instructed to return before then or to call the office if there are any problems in the interim.

## 2022-07-20 ENCOUNTER — RESULT CHARGE (OUTPATIENT)
Age: 42
End: 2022-07-20

## 2022-07-22 ENCOUNTER — LABORATORY RESULT (OUTPATIENT)
Age: 42
End: 2022-07-22

## 2022-07-22 ENCOUNTER — APPOINTMENT (OUTPATIENT)
Dept: INTERNAL MEDICINE | Facility: CLINIC | Age: 42
End: 2022-07-22

## 2022-07-22 ENCOUNTER — NON-APPOINTMENT (OUTPATIENT)
Age: 42
End: 2022-07-22

## 2022-07-22 VITALS
DIASTOLIC BLOOD PRESSURE: 80 MMHG | SYSTOLIC BLOOD PRESSURE: 110 MMHG | HEIGHT: 60 IN | BODY MASS INDEX: 30.04 KG/M2 | WEIGHT: 153 LBS | HEART RATE: 81 BPM | RESPIRATION RATE: 14 BRPM | OXYGEN SATURATION: 98 % | TEMPERATURE: 98.6 F

## 2022-07-22 DIAGNOSIS — M25.532 PAIN IN LEFT WRIST: ICD-10-CM

## 2022-07-22 DIAGNOSIS — M25.541 PAIN IN JOINTS OF RIGHT HAND: ICD-10-CM

## 2022-07-22 PROCEDURE — 93000 ELECTROCARDIOGRAM COMPLETE: CPT | Mod: 59

## 2022-07-22 PROCEDURE — 99213 OFFICE O/P EST LOW 20 MIN: CPT | Mod: 25

## 2022-07-22 PROCEDURE — 90739 HEPB VACC 2/4 DOSE ADULT IM: CPT

## 2022-07-22 PROCEDURE — 99396 PREV VISIT EST AGE 40-64: CPT | Mod: 25

## 2022-07-22 PROCEDURE — 36415 COLL VENOUS BLD VENIPUNCTURE: CPT

## 2022-07-22 PROCEDURE — 90471 IMMUNIZATION ADMIN: CPT

## 2022-07-22 NOTE — ADDENDUM
[FreeTextEntry1] : I, Mohsen Mclaughlin, acted solely as scribe for Dr. See Mead DO on this date 07/22/2022 10:20AM .\par \par All medical record entries made by the Scribe were at my, Dr. See Mead DO direction and personally dictated by me on 07/22/2022 10:20AM. I have reviewed the chart and agree that the record accurately reflects my personal performance of the history, physical exam, assessment and plan. I have also personally directed, reviewed and agreed with the chart.

## 2022-07-22 NOTE — HEALTH RISK ASSESSMENT
[Never] : Never [No] : In the past 12 months have you used drugs other than those required for medical reasons? No [0] : 2) Feeling down, depressed, or hopeless: Not at all (0) [PHQ-2 Negative - No further assessment needed] : PHQ-2 Negative - No further assessment needed [Audit-CScore] : 0 [TOX8Uiosp] : 0 [MammogramComments] : Breast US (3/19/21): BI-RADS 2 - Benign findings.  [PapSmearDate] : 02/22 [PapSmearComments] : NILM.

## 2022-07-22 NOTE — ASSESSMENT
[FreeTextEntry1] : Patient is a 41 year old female with a past medical history as above who presents for an annual wellness visit.

## 2022-07-22 NOTE — HISTORY OF PRESENT ILLNESS
[FreeTextEntry1] : annual wellness visit [de-identified] : Patient is a 41 year old female with a past medical history as below who presents for an annual wellness visit. Patient is not regularly taking any prescription medications. Patient has seen GYN in the past year for her annual visit. Patient received the flu vaccine for this past season on 1/11/22. She has received 3 doses of the COVID-19 Vaccine.  \par \par Patient had been seeing orthopedic hand specialist, Dr. Barahona regarding right hand 1st digit trigger finger, s/p release on 7/5/22. She had been taking Ibuprofen for post-operative pain; no numbness/tingling.\par Patient continues to note left wrist pain.\par \par Patient inquires about receiving the Varicella Vaccine and Hepatitis B Booster today; required prior to obtaining Green Card.

## 2022-07-22 NOTE — PHYSICAL EXAM
[No Acute Distress] : no acute distress [Well Nourished] : well nourished [Well Developed] : well developed [Well-Appearing] : well-appearing [Normal Sclera/Conjunctiva] : normal sclera/conjunctiva [PERRL] : pupils equal round and reactive to light [EOMI] : extraocular movements intact [Normal Outer Ear/Nose] : the outer ears and nose were normal in appearance [Normal Oropharynx] : the oropharynx was normal [No JVD] : no jugular venous distention [No Lymphadenopathy] : no lymphadenopathy [Supple] : supple [Thyroid Normal, No Nodules] : the thyroid was normal and there were no nodules present [No Respiratory Distress] : no respiratory distress  [No Accessory Muscle Use] : no accessory muscle use [Clear to Auscultation] : lungs were clear to auscultation bilaterally [Normal Rate] : normal rate  [Regular Rhythm] : with a regular rhythm [Normal S1, S2] : normal S1 and S2 [No Murmur] : no murmur heard [No Carotid Bruits] : no carotid bruits [No Abdominal Bruit] : a ~M bruit was not heard ~T in the abdomen [No Varicosities] : no varicosities [Pedal Pulses Present] : the pedal pulses are present [No Edema] : there was no peripheral edema [No Palpable Aorta] : no palpable aorta [No Extremity Clubbing/Cyanosis] : no extremity clubbing/cyanosis [Soft] : abdomen soft [Non Tender] : non-tender [Non-distended] : non-distended [No Masses] : no abdominal mass palpated [No HSM] : no HSM [Normal Bowel Sounds] : normal bowel sounds [Normal Posterior Cervical Nodes] : no posterior cervical lymphadenopathy [Normal Anterior Cervical Nodes] : no anterior cervical lymphadenopathy [No CVA Tenderness] : no CVA  tenderness [No Spinal Tenderness] : no spinal tenderness [No Joint Swelling] : no joint swelling [Grossly Normal Strength/Tone] : grossly normal strength/tone [No Rash] : no rash [Coordination Grossly Intact] : coordination grossly intact [No Focal Deficits] : no focal deficits [Normal Gait] : normal gait [Deep Tendon Reflexes (DTR)] : deep tendon reflexes were 2+ and symmetric [Normal Affect] : the affect was normal [Normal Insight/Judgement] : insight and judgment were intact [de-identified] : overweight

## 2022-07-22 NOTE — PLAN
[FreeTextEntry1] : Cardiology\par mild hypertriglyceridemia - advised low cholesterol/low fat and low carbohydrate/low sugar diet - check FLP\par history of low HDL - previously recommended increasing CV exercise - recheck HDL \par Endocrinology\par Hashimoto's Disease/weight gain - check thyroid panel - previously advised to follow up with endocrinologist, Dr. Guzman\par history of hyperglycemia (prediabetes) - advised low carbohydrate/low sugar diet; previously recommended increasing CV exercise - check hemoglobin A1C\par Vitamin D deficiency - previously advised to start Vitamin D-3 4000 IU p.o.q.d. with a meal for 1 month and then decrease dosage to 2000 IU daily thereafter - check Vitamin D \par Gynecology\par history of breast cyst excision - continue to follow up with breast surgeon, Dr. Walker\par Musculoskeletal\par right hand, 1st digit trigger finger - s/p release on 7/5/22 - continue Ibuprofen p.o. p.r.n. with food as directed - follow up with orthopedist, Dr. Barahona as necessary \par left wrist pain - follow up with orthopedist, Dr. Barahona as necessary \par Immunization\par Hepatitis B Booster Vaccine - 0.5mL x 1 administered intramuscularly to right deltoid\par Varicella Vaccine - recommended following up at pharmacy for vaccine administration \par \par check EKG (results as above), female panel, hemoglobin A1C, and thyroid panel

## 2022-07-23 ENCOUNTER — NON-APPOINTMENT (OUTPATIENT)
Age: 42
End: 2022-07-23

## 2022-07-23 LAB
25(OH)D3 SERPL-MCNC: 33.3 NG/ML
ALBUMIN SERPL ELPH-MCNC: 4.8 G/DL
ALP BLD-CCNC: 107 U/L
ALT SERPL-CCNC: 31 U/L
ANION GAP SERPL CALC-SCNC: 11 MMOL/L
AST SERPL-CCNC: 28 U/L
BASOPHILS # BLD AUTO: 0.04 K/UL
BASOPHILS NFR BLD AUTO: 0.5 %
BILIRUB SERPL-MCNC: 0.4 MG/DL
BUN SERPL-MCNC: 13 MG/DL
CALCIUM SERPL-MCNC: 9.6 MG/DL
CHLORIDE SERPL-SCNC: 103 MMOL/L
CHOLEST SERPL-MCNC: 191 MG/DL
CO2 SERPL-SCNC: 27 MMOL/L
CREAT SERPL-MCNC: 0.67 MG/DL
EGFR: 113 ML/MIN/1.73M2
EOSINOPHIL # BLD AUTO: 0.12 K/UL
EOSINOPHIL NFR BLD AUTO: 1.5 %
ESTIMATED AVERAGE GLUCOSE: 120 MG/DL
GLUCOSE SERPL-MCNC: 85 MG/DL
HBA1C MFR BLD HPLC: 5.8 %
HCT VFR BLD CALC: 41.8 %
HDLC SERPL-MCNC: 40 MG/DL
HGB BLD-MCNC: 12.8 G/DL
IMM GRANULOCYTES NFR BLD AUTO: 0.5 %
LDLC SERPL CALC-MCNC: 109 MG/DL
LYMPHOCYTES # BLD AUTO: 2.7 K/UL
LYMPHOCYTES NFR BLD AUTO: 32.9 %
MAN DIFF?: NORMAL
MCHC RBC-ENTMCNC: 27.8 PG
MCHC RBC-ENTMCNC: 30.6 GM/DL
MCV RBC AUTO: 90.9 FL
MONOCYTES # BLD AUTO: 0.64 K/UL
MONOCYTES NFR BLD AUTO: 7.8 %
NEUTROPHILS # BLD AUTO: 4.67 K/UL
NEUTROPHILS NFR BLD AUTO: 56.8 %
NONHDLC SERPL-MCNC: 152 MG/DL
PLATELET # BLD AUTO: 289 K/UL
POTASSIUM SERPL-SCNC: 4.6 MMOL/L
PROT SERPL-MCNC: 7.3 G/DL
RBC # BLD: 4.6 M/UL
RBC # FLD: 15.1 %
SODIUM SERPL-SCNC: 141 MMOL/L
T3 SERPL-MCNC: 123 NG/DL
T3FREE SERPL-MCNC: 3.22 PG/ML
T3RU NFR SERPL: 1.1 TBI
T4 FREE SERPL-MCNC: 1.1 NG/DL
T4 SERPL-MCNC: 6.6 UG/DL
TRIGL SERPL-MCNC: 215 MG/DL
TSH SERPL-ACNC: 2.26 UIU/ML
WBC # FLD AUTO: 8.21 K/UL

## 2022-08-03 LAB
THYROGLOB AB SERPL-ACNC: <20 IU/ML
THYROPEROXIDASE AB SERPL IA-ACNC: 14.5 IU/ML

## 2022-11-03 NOTE — H&P PST ADULT - NS PRO AD NO ADVANCE DIRECTIVE
[id number]     No

## 2023-01-30 ENCOUNTER — LABORATORY RESULT (OUTPATIENT)
Age: 43
End: 2023-01-30

## 2023-01-30 ENCOUNTER — APPOINTMENT (OUTPATIENT)
Dept: INTERNAL MEDICINE | Facility: CLINIC | Age: 43
End: 2023-01-30
Payer: COMMERCIAL

## 2023-01-30 VITALS
TEMPERATURE: 97.2 F | WEIGHT: 146.38 LBS | HEART RATE: 68 BPM | SYSTOLIC BLOOD PRESSURE: 110 MMHG | RESPIRATION RATE: 16 BRPM | HEIGHT: 60 IN | DIASTOLIC BLOOD PRESSURE: 70 MMHG | BODY MASS INDEX: 28.74 KG/M2 | OXYGEN SATURATION: 98 %

## 2023-01-30 DIAGNOSIS — E06.3 AUTOIMMUNE THYROIDITIS: ICD-10-CM

## 2023-01-30 DIAGNOSIS — R73.9 HYPERGLYCEMIA, UNSPECIFIED: ICD-10-CM

## 2023-01-30 DIAGNOSIS — E78.1 PURE HYPERGLYCERIDEMIA: ICD-10-CM

## 2023-01-30 DIAGNOSIS — E55.9 VITAMIN D DEFICIENCY, UNSPECIFIED: ICD-10-CM

## 2023-01-30 DIAGNOSIS — H10.9 UNSPECIFIED CONJUNCTIVITIS: ICD-10-CM

## 2023-01-30 PROCEDURE — 36415 COLL VENOUS BLD VENIPUNCTURE: CPT

## 2023-01-30 PROCEDURE — 99214 OFFICE O/P EST MOD 30 MIN: CPT | Mod: 25

## 2023-01-30 RX ORDER — MELOXICAM 7.5 MG/1
7.5 TABLET ORAL
Qty: 28 | Refills: 0 | Status: COMPLETED | COMMUNITY
Start: 2021-12-13 | End: 2023-01-30

## 2023-01-30 RX ORDER — OMEPRAZOLE 40 MG/1
40 CAPSULE, DELAYED RELEASE ORAL
Qty: 30 | Refills: 1 | Status: COMPLETED | COMMUNITY
Start: 2022-05-18 | End: 2023-01-30

## 2023-01-30 NOTE — COUNSELING
[Potential consequences of obesity discussed] : Potential consequences of obesity discussed [Encouraged to increase physical activity] : Encouraged to increase physical activity [Good understanding] : Patient has a good understanding of disease, goals and obesity follow-up plan [FreeTextEntry2] :  low glycemic diet

## 2023-01-30 NOTE — ASSESSMENT
[FreeTextEntry1] : Patient is a 42 year old female with a past medical history as above who presents for  fasting blood work and eye irritation

## 2023-01-30 NOTE — HEALTH RISK ASSESSMENT
[Never] : Never [No] : In the past 12 months have you used drugs other than those required for medical reasons? No [0] : 2) Feeling down, depressed, or hopeless: Not at all (0) [PHQ-2 Negative - No further assessment needed] : PHQ-2 Negative - No further assessment needed [Audit-CScore] : 0 [EPP2Fpneb] : 0

## 2023-01-30 NOTE — HISTORY OF PRESENT ILLNESS
[Friend] : friend [FreeTextEntry1] : Fasting blood work [de-identified] : Mrs. LARSON is a 42 year  female, with a past medical history as noted below,who present to the office  today for  fasting blood work.    Patient states she recently went to urgent care for sore throat was evaluated for COVID-19.  COVID swab was negative.  Was placed on amoxicillin for the sore throat.    States she is feeling better.  Denies fever denies cough sore throat is improving.\par   Patient also states she had a flu shot done here in October(   Advised patient she was not seen in October in our office and we have no record of giving her flu shot).  \par   Patient also states she been having itchy red eyes at times in the morning she wakes up with them crusted shut.  Been using over-the-counter pinkeye without any relief\par

## 2023-01-30 NOTE — PHYSICAL EXAM
[No Acute Distress] : no acute distress [Well Nourished] : well nourished [Well Developed] : well developed [Well-Appearing] : well-appearing [Normal Sclera/Conjunctiva] : normal sclera/conjunctiva [PERRL] : pupils equal round and reactive to light [EOMI] : extraocular movements intact [Normal Outer Ear/Nose] : the outer ears and nose were normal in appearance [Normal Oropharynx] : the oropharynx was normal [No JVD] : no jugular venous distention [No Lymphadenopathy] : no lymphadenopathy [Supple] : supple [Thyroid Normal, No Nodules] : the thyroid was normal and there were no nodules present [No Respiratory Distress] : no respiratory distress  [No Accessory Muscle Use] : no accessory muscle use [Clear to Auscultation] : lungs were clear to auscultation bilaterally [Normal Rate] : normal rate  [Regular Rhythm] : with a regular rhythm [Normal S1, S2] : normal S1 and S2 [No Murmur] : no murmur heard [No Carotid Bruits] : no carotid bruits [No Abdominal Bruit] : a ~M bruit was not heard ~T in the abdomen [Pedal Pulses Present] : the pedal pulses are present [No Edema] : there was no peripheral edema [No Palpable Aorta] : no palpable aorta [No Extremity Clubbing/Cyanosis] : no extremity clubbing/cyanosis [Soft] : abdomen soft [Non Tender] : non-tender [Non-distended] : non-distended [No Masses] : no abdominal mass palpated [No HSM] : no HSM [Normal Bowel Sounds] : normal bowel sounds [Normal Posterior Cervical Nodes] : no posterior cervical lymphadenopathy [Normal Anterior Cervical Nodes] : no anterior cervical lymphadenopathy [No CVA Tenderness] : no CVA  tenderness [No Spinal Tenderness] : no spinal tenderness [No Joint Swelling] : no joint swelling [Grossly Normal Strength/Tone] : grossly normal strength/tone [No Rash] : no rash [Coordination Grossly Intact] : coordination grossly intact [No Focal Deficits] : no focal deficits [Normal Gait] : normal gait [Deep Tendon Reflexes (DTR)] : deep tendon reflexes were 2+ and symmetric [Speech Grossly Normal] : speech grossly normal [Normal Affect] : the affect was normal [Alert and Oriented x3] : oriented to person, place, and time [Normal Mood] : the mood was normal [Normal Insight/Judgement] : insight and judgment were intact [de-identified] : overweight

## 2023-01-30 NOTE — PAST MEDICAL HISTORY
no [Menstruating] : menstruating [Definite ___ (Date)] : the last menstrual period was [unfilled] [FreeTextEntry1] : denies preg

## 2023-01-30 NOTE — DATA REVIEWED
[FreeTextEntry1] :  reviewed July his blood work, CBC, comprehensive metabolic, lipid panel, A1c as well as TSH.\par A1c was 5.8, LDLs 109 total cholesterol 191 triglycerides 215

## 2023-01-30 NOTE — REVIEW OF SYSTEMS
[Fever] : no fever [Fatigue] : no fatigue [Recent Change In Weight] : ~T no recent weight change [Discharge] : discharge [Redness] : redness [Itching] : itching [Hearing Loss] : no hearing loss [Nasal Discharge] : no nasal discharge [Postnasal Drip] : no postnasal drip [Chest Pain] : no chest pain [Leg Claudication] : no leg claudication [Lower Ext Edema] : no lower extremity edema [Paroxysmal Nocturnal Dyspnea] : no paroxysmal nocturnal dyspnea [Abdominal Pain] : no abdominal pain [Nausea] : no nausea [Constipation] : no constipation [Diarrhea] : diarrhea [Heartburn] : no heartburn [Melena] : no melena [Dysuria] : no dysuria [Joint Pain] : joint pain [Itching] : no itching [Skin Rash] : no skin rash [Headache] : no headache [Dizziness] : no dizziness [Memory Loss] : no memory loss [Unsteady Walking] : no ataxia [Easy Bleeding] : no easy bleeding [Swollen Glands] : no swollen glands [Negative] : Heme/Lymph [FreeTextEntry4] : Sore throat is improved [FreeTextEntry9] : left wrist pain

## 2023-02-02 LAB
ALBUMIN SERPL ELPH-MCNC: 4.6 G/DL
ALP BLD-CCNC: 118 U/L
ALT SERPL-CCNC: 29 U/L
ANION GAP SERPL CALC-SCNC: 11 MMOL/L
AST SERPL-CCNC: 21 U/L
BASOPHILS # BLD AUTO: 0.06 K/UL
BASOPHILS NFR BLD AUTO: 0.9 %
BILIRUB SERPL-MCNC: 0.2 MG/DL
BUN SERPL-MCNC: 14 MG/DL
CALCIUM SERPL-MCNC: 9.5 MG/DL
CHLORIDE SERPL-SCNC: 103 MMOL/L
CHOLEST SERPL-MCNC: 161 MG/DL
CO2 SERPL-SCNC: 26 MMOL/L
CREAT SERPL-MCNC: 0.65 MG/DL
EGFR: 113 ML/MIN/1.73M2
EOSINOPHIL # BLD AUTO: 0.1 K/UL
EOSINOPHIL NFR BLD AUTO: 1.5 %
ESTIMATED AVERAGE GLUCOSE: 117 MG/DL
GLUCOSE SERPL-MCNC: 87 MG/DL
HBA1C MFR BLD HPLC: 5.7 %
HCT VFR BLD CALC: 41.5 %
HDLC SERPL-MCNC: 33 MG/DL
HGB BLD-MCNC: 12.8 G/DL
IMM GRANULOCYTES NFR BLD AUTO: 0.3 %
LDLC SERPL CALC-MCNC: 91 MG/DL
LYMPHOCYTES # BLD AUTO: 3.39 K/UL
LYMPHOCYTES NFR BLD AUTO: 52.3 %
MAN DIFF?: NORMAL
MCHC RBC-ENTMCNC: 27.6 PG
MCHC RBC-ENTMCNC: 30.8 GM/DL
MCV RBC AUTO: 89.4 FL
MONOCYTES # BLD AUTO: 0.35 K/UL
MONOCYTES NFR BLD AUTO: 5.4 %
NEUTROPHILS # BLD AUTO: 2.56 K/UL
NEUTROPHILS NFR BLD AUTO: 39.6 %
NONHDLC SERPL-MCNC: 127 MG/DL
PLATELET # BLD AUTO: 321 K/UL
POTASSIUM SERPL-SCNC: 4.3 MMOL/L
PROT SERPL-MCNC: 7.2 G/DL
RBC # BLD: 4.64 M/UL
RBC # FLD: 14.2 %
SODIUM SERPL-SCNC: 141 MMOL/L
T4 FREE SERPL-MCNC: 1.1 NG/DL
TRIGL SERPL-MCNC: 181 MG/DL
TSH SERPL-ACNC: 1.58 UIU/ML
WBC # FLD AUTO: 6.48 K/UL

## 2023-02-04 ENCOUNTER — APPOINTMENT (OUTPATIENT)
Dept: OBGYN | Facility: CLINIC | Age: 43
End: 2023-02-04
Payer: COMMERCIAL

## 2023-02-04 VITALS
DIASTOLIC BLOOD PRESSURE: 62 MMHG | HEIGHT: 60 IN | BODY MASS INDEX: 28.66 KG/M2 | SYSTOLIC BLOOD PRESSURE: 96 MMHG | WEIGHT: 146 LBS

## 2023-02-04 DIAGNOSIS — Z12.39 ENCOUNTER FOR OTHER SCREENING FOR MALIGNANT NEOPLASM OF BREAST: ICD-10-CM

## 2023-02-04 DIAGNOSIS — Z87.2 PERSONAL HISTORY OF DISEASES OF THE SKIN AND SUBCUTANEOUS TISSUE: ICD-10-CM

## 2023-02-04 DIAGNOSIS — N95.1 MENOPAUSAL AND FEMALE CLIMACTERIC STATES: ICD-10-CM

## 2023-02-04 DIAGNOSIS — Z12.4 ENCOUNTER FOR SCREENING FOR MALIGNANT NEOPLASM OF CERVIX: ICD-10-CM

## 2023-02-04 DIAGNOSIS — Z01.411 ENCOUNTER FOR GYNECOLOGICAL EXAMINATION (GENERAL) (ROUTINE) WITH ABNORMAL FINDINGS: ICD-10-CM

## 2023-02-04 LAB
HCG UR QL: NEGATIVE
QUALITY CONTROL: YES

## 2023-02-04 PROCEDURE — 81025 URINE PREGNANCY TEST: CPT

## 2023-02-04 PROCEDURE — 99203 OFFICE O/P NEW LOW 30 MIN: CPT | Mod: 25

## 2023-02-04 PROCEDURE — 99386 PREV VISIT NEW AGE 40-64: CPT

## 2023-02-04 NOTE — PLAN
[FreeTextEntry1] : Options for symptomatic menopause reviewed\par Pt wishes to consult endocrinology to r/o any auto-immune issues related to other symptoms and this is reasonable as she was being followed for abnormal thyroid blood work in the past and has not followed up\par fu annual GYN exam or sooner prn

## 2023-02-04 NOTE — PHYSICAL EXAM
[Chaperone Present] : A chaperone was present in the examining room during all aspects of the physical examination [Appropriately responsive] : appropriately responsive [Alert] : alert [No Acute Distress] : no acute distress [No Lymphadenopathy] : no lymphadenopathy [Regular Rate Rhythm] : regular rate rhythm [No Murmurs] : no murmurs [Clear to Auscultation B/L] : clear to auscultation bilaterally [Soft] : soft [Non-tender] : non-tender [Non-distended] : non-distended [No HSM] : No HSM [No Lesions] : no lesions [No Mass] : no mass [Oriented x3] : oriented x3 [Examination Of The Breasts] : a normal appearance [No Masses] : no breast masses were palpable [Labia Minora] : normal [Labia Majora] : normal [Normal] : normal [Uterine Adnexae] : normal

## 2023-02-04 NOTE — HISTORY OF PRESENT ILLNESS
[N] : Patient does not use contraception [Y] : Positive pregnancy history [Menarche Age: ____] : age at menarche was [unfilled] [No] : Patient does not have concerns regarding sex [Currently Active] : currently active [Mammogramdate] : 09/19/22 [TextBox_19] : BR2 [BreastSonogramDate] : 09/19/22 [TextBox_25] : BR2 [PapSmeardate] : 02/2022 [TextBox_31] : wnl per pt [ColonoscopyDate] : NEVER [LMPDate] : 02/14/22 [PGHxTotal] : 2 [[facility] : 2 [[facility] [FreeTextEntry1] : 02/14/22

## 2023-02-06 ENCOUNTER — TRANSCRIPTION ENCOUNTER (OUTPATIENT)
Age: 43
End: 2023-02-06

## 2023-02-21 ENCOUNTER — APPOINTMENT (OUTPATIENT)
Dept: OBGYN | Facility: CLINIC | Age: 43
End: 2023-02-21
Payer: COMMERCIAL

## 2023-02-21 VITALS
DIASTOLIC BLOOD PRESSURE: 76 MMHG | HEIGHT: 60 IN | SYSTOLIC BLOOD PRESSURE: 120 MMHG | BODY MASS INDEX: 28.66 KG/M2 | TEMPERATURE: 97 F | WEIGHT: 146 LBS

## 2023-02-21 DIAGNOSIS — N94.10 UNSPECIFIED DYSPAREUNIA: ICD-10-CM

## 2023-02-21 DIAGNOSIS — N95.1 MENOPAUSAL AND FEMALE CLIMACTERIC STATES: ICD-10-CM

## 2023-02-21 LAB
CYTOLOGY CVX/VAG DOC THIN PREP: NORMAL
HPV HIGH+LOW RISK DNA PNL CVX: NOT DETECTED

## 2023-02-21 PROCEDURE — 36415 COLL VENOUS BLD VENIPUNCTURE: CPT

## 2023-02-21 PROCEDURE — 99213 OFFICE O/P EST LOW 20 MIN: CPT

## 2023-02-21 RX ORDER — SKULLCAP 100 MG
CAPSULE ORAL
Refills: 0 | Status: DISCONTINUED | COMMUNITY
End: 2023-02-21

## 2023-02-21 RX ORDER — FAMOTIDINE 20 MG/1
20 TABLET, FILM COATED ORAL
Refills: 0 | Status: DISCONTINUED | COMMUNITY
End: 2023-02-21

## 2023-02-21 RX ORDER — CIPROFLOXACIN 3 MG/ML
0.3 SOLUTION OPHTHALMIC EVERY 6 HOURS
Qty: 1 | Refills: 0 | Status: DISCONTINUED | COMMUNITY
Start: 2023-01-30 | End: 2023-02-21

## 2023-02-21 RX ORDER — ERGOCALCIFEROL 1.25 MG/1
1.25 MG CAPSULE, LIQUID FILLED ORAL
Refills: 0 | Status: DISCONTINUED | COMMUNITY
Start: 2021-11-15 | End: 2023-02-21

## 2023-02-21 NOTE — HISTORY OF PRESENT ILLNESS
[N] : Patient does not use contraception [Y] : Positive pregnancy history [Menarche Age: ____] : age at menarche was [unfilled] [No] : Patient does not have concerns regarding sex [Currently Active] : currently active [PapSmeardate] : 02/04/23 [TextBox_31] : NEG [HPVDate] : 02/04/23 [TextBox_78] : NEG [LMPDate] : 02/14/22 [PGHxTotal] : 2 [[facility] [[facility] : 2 [FreeTextEntry1] : 02/14/22

## 2023-02-21 NOTE — PLAN
[FreeTextEntry1] : fu 1 week discuss results and plan\par Treatment options hot flushes reviewed- pt will think it over

## 2023-03-02 ENCOUNTER — APPOINTMENT (OUTPATIENT)
Dept: OBGYN | Facility: CLINIC | Age: 43
End: 2023-03-02

## 2023-03-02 LAB
BASOPHILS # BLD AUTO: 0.07 K/UL
BASOPHILS NFR BLD AUTO: 0.7 %
EOSINOPHIL # BLD AUTO: 0.14 K/UL
EOSINOPHIL NFR BLD AUTO: 1.4 %
ESTRADIOL SERPL-MCNC: <5 PG/ML
FSH SERPL-MCNC: 72.5 IU/L
HCT VFR BLD CALC: 39.1 %
HGB BLD-MCNC: 12.8 G/DL
IMM GRANULOCYTES NFR BLD AUTO: 0.4 %
LH SERPL-ACNC: 43 IU/L
LYMPHOCYTES # BLD AUTO: 3.75 K/UL
LYMPHOCYTES NFR BLD AUTO: 36.5 %
MAN DIFF?: NORMAL
MCHC RBC-ENTMCNC: 28.4 PG
MCHC RBC-ENTMCNC: 32.7 GM/DL
MCV RBC AUTO: 86.9 FL
MONOCYTES # BLD AUTO: 0.87 K/UL
MONOCYTES NFR BLD AUTO: 8.5 %
NEUTROPHILS # BLD AUTO: 5.4 K/UL
NEUTROPHILS NFR BLD AUTO: 52.5 %
PLATELET # BLD AUTO: 298 K/UL
PROLACTIN SERPL-MCNC: 12.8 NG/ML
RBC # BLD: 4.5 M/UL
RBC # FLD: 13.6 %
TSH SERPL-ACNC: 1.75 UIU/ML
WBC # FLD AUTO: 10.27 K/UL

## 2023-06-19 ENCOUNTER — NON-APPOINTMENT (OUTPATIENT)
Age: 43
End: 2023-06-19

## 2023-06-27 ENCOUNTER — NON-APPOINTMENT (OUTPATIENT)
Age: 43
End: 2023-06-27

## 2023-06-28 ENCOUNTER — APPOINTMENT (OUTPATIENT)
Dept: ORTHOPEDIC SURGERY | Facility: CLINIC | Age: 43
End: 2023-06-28
Payer: COMMERCIAL

## 2023-06-28 PROCEDURE — 20550 NJX 1 TENDON SHEATH/LIGAMENT: CPT | Mod: LT

## 2023-06-28 PROCEDURE — 99214 OFFICE O/P EST MOD 30 MIN: CPT | Mod: 25

## 2023-06-28 NOTE — END OF VISIT
[FreeTextEntry3] : This note was written by Jailene Mobley on 06/28/2023 acting solely as a scribe for Dr. Jackson Barahona.\par  \par All medical record entries made by the Scribe were at my, Dr. Jackson Barahona, direction and personally dictated by me on 06/28/2023. I have personally reviewed the chart and agree that the record accurately reflects my personal performance of the history, physical exam, assessment and plan.

## 2023-06-28 NOTE — DISCUSSION/SUMMARY
[FreeTextEntry1] : I had a discussion regarding today's visit, the diagnosis and treatment recommendations and options.  We also discussed changes since the last visit.  She has findings consistent with a left trigger thumb and bilateral carpal tunnel syndrome.\par \par With regard to the left thumb, she agreed to proceed with a cortisone injection at the flexor tendon sheath.\par \par With regard to the bilateral hand/wrist numbness and tingling, I recommended use of bilateral carpal tunnel splints at night, which she was fitted with in the office today. If in about 4 weeks, her symptoms persist or worsen, she was instructed to return to the office for further treatment. She will follow up in 4 weeks, according to her symptoms in this regard. \par \par The patient has agreed to the above plan of management and has expressed full understanding.  All questions were fully answered to the patient's satisfaction.\par \par My cumulative time spent on today's visit was greater than 30 minutes and included: Preparation for the visit, review of the medical records, review of pertinent diagnostic studies, examination and counseling of the patient on the above diagnosis, treatment plan and prognosis, orders of diagnostic tests, medications and/or appropriate procedures and documentation in the medical records of today's visit.

## 2023-06-28 NOTE — ADDENDUM
[FreeTextEntry1] : I, Jailene Mobley, acted solely as a scribe for Dr. Barahona on this date on 06/28/2023.

## 2023-06-28 NOTE — HISTORY OF PRESENT ILLNESS
[FreeTextEntry1] : She returns today with left thumb pain x 3 weeks. She notes triggering of her left thumb, which is worsened with use. She additionally complains of bilateral hand numbness and tingling, which is most notable at night. She rates her symptoms overall as an 8 to 10 out of 10. \par \par She is status post right trigger thumb release on 7/5/2022.  She is doing well in this regard. 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  We previously discussed surgery for her left wrist.\par \par She is accompanied by her son today. He aided in translation from [place] to English. \par \par

## 2023-06-28 NOTE — PHYSICAL EXAM
[de-identified] : - Constitutional: This is a female in no obvious distress.  She is accompanied by her son today.\par - Psych: Patient is alert and oriented to person, place and time.  Patient has a normal mood and affect.\par - Cardiovascular: Normal pulses throughout the upper extremities.  No significant varicosities are noted in the upper extremities. \par - Neuro: Strength and sensation are intact throughout the upper extremities.  Patient has normal coordination.\par - Respiratory:  Patient exhibits no evidence of shortness of breath or difficulty breathing.\par - Skin: No rashes, lesions, or other abnormalities are noted in the upper extremities.\par \par --- \par \par Examination of her right thumb demonstrates her incision to be well-healed.  \par \par Examination of her left thumb demonstrates swelling and tenderness along the A1 pulley.  There is triggering of the thumb.  There is no triggering of the other digits.\par \par Provocative signs for carpal tunnel syndrome are positive bilaterally.  She has intact sensation to light touch bilaterally along the radial, ulnar and median nerve distributions. [de-identified] : An MRI of her left wrist from 2/18/2022 demonstrated moderate to severe tendinosis and tenosynovitis of the ECU tendon.  Intrasubstance degeneration of the distal aspect of the styloid attachment of the TFCC ligament.  Mild enlargement of the median nerve consistent with carpal tunnel syndrome.\par \par Radiographs of her right hand and left wrist from 12/21/21 demonstrated no fractures or dislocations and preserved joint spaces and no joint margin erosions.

## 2023-06-28 NOTE — PROCEDURE
[FreeTextEntry1] : -  After a discussion of risks and benefits, the patient agreed to proceed with a cortisone injection.  \par -  Side: Left\par -  Finger: Trigger thumb\par -  Medications: 0.5 cc of 1% Lidocaine and 1 cc of Kenalog, 40mg/cc, using sterile technique.\par -  Patient tolerated the procedure well, without complications.\par -  Patient was told that the symptoms may worsen for a day or two, and should then begin to improve. \par -  Instructions: Patient was instructed on activity modification for the next several days.\par -  Follow-up: Within 4 weeks to assess response to the injection.

## 2023-07-28 PROBLEM — M77.8 LEFT WRIST TENDINITIS: Status: ACTIVE | Noted: 2022-01-05

## 2023-07-28 PROBLEM — G56.01 CARPAL TUNNEL SYNDROME OF RIGHT WRIST: Status: ACTIVE | Noted: 2023-06-28

## 2023-07-28 PROBLEM — S63.592A TEAR OF TRIANGULAR FIBROCARTILAGE COMPLEX (TFCC) OF LEFT WRIST: Status: ACTIVE | Noted: 2022-03-01

## 2023-08-04 ENCOUNTER — APPOINTMENT (OUTPATIENT)
Dept: ORTHOPEDIC SURGERY | Facility: CLINIC | Age: 43
End: 2023-08-04
Payer: COMMERCIAL

## 2023-08-04 DIAGNOSIS — M77.8 OTHER ENTHESOPATHIES, NOT ELSEWHERE CLASSIFIED: ICD-10-CM

## 2023-08-04 DIAGNOSIS — S63.592A OTHER SPECIFIED SPRAIN OF LEFT WRIST, INITIAL ENCOUNTER: ICD-10-CM

## 2023-08-04 DIAGNOSIS — G56.01 CARPAL TUNNEL SYNDROME, RIGHT UPPER LIMB: ICD-10-CM

## 2023-08-04 PROCEDURE — 99214 OFFICE O/P EST MOD 30 MIN: CPT

## 2023-08-04 NOTE — HISTORY OF PRESENT ILLNESS
[Right] : right hand dominant [FreeTextEntry1] : 37 days status post left trigger thumb cortisone injection #1.  She also has bilateral carpal tunnel syndrome and I recommended carpal tunnel splints at night.  She returns today for a follow-up. Her previous left trigger cortisone injection #1   She is status post right trigger thumb release on 7/5/2022.  She is doing well in this regard. She reports improved symptoms, with no more locking in her left trigger thumb. She reports continued bilateral hand numbness and tingling, that is alleviated with bracing.  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  We previously discussed surgery for her left wrist.  She is accompanied by her son today. He aided in translation from Frisian to English.

## 2023-08-04 NOTE — ADDENDUM
[FreeTextEntry1] : I, Mayra Hatch, acted solely as a scribe for Dr. Barahona on this date on 08/04/2023.

## 2023-08-04 NOTE — END OF VISIT
[FreeTextEntry3] : This note was written by Mayra Hatch on 08/04/2023 acting solely as a scribe for Dr. Jackson Barahona.   All medical record entries made by the Scribe were at my, Dr. Jackson Barahona, direction and personally dictated by me on 08/04/2023. I have personally reviewed the chart and agree that the record accurately reflects my personal performance of the history, physical exam, assessment and plan.

## 2023-08-04 NOTE — DISCUSSION/SUMMARY
[FreeTextEntry1] : I had a discussion regarding today's visit, the diagnosis and treatment recommendations and options.  We also discussed changes since the last visit.  At this time, given the improvement in her symptoms, I recommended observation of the left thumb. If her triggering returns, she will return for a further treatment.  With regards to the bilateral hand numbness and tingling, given she notes relief with bracing of her wrists and is doing well in this regard, I recommenced observation. She will follow up on an as needed basis.  The patient has agreed to the above plan of management and has expressed full understanding.  All questions were fully answered to the patient's satisfaction.  My cumulative time spent on today's visit was greater than 30 minutes and included: Preparation for the visit, review of the medical records, review of pertinent diagnostic studies, examination and counseling of the patient on the above diagnosis, treatment plan and prognosis, orders of diagnostic tests, medications and/or appropriate procedures and documentation in the medical records of today's visit.

## 2023-08-04 NOTE — PHYSICAL EXAM
[de-identified] : - Constitutional: This is a female in no obvious distress.  She is accompanied by her son today. - Psych: Patient is alert and oriented to person, place and time.  Patient has a normal mood and affect. - Cardiovascular: Normal pulses throughout the upper extremities.  No significant varicosities are noted in the upper extremities.  - Neuro: Strength and sensation are intact throughout the upper extremities.  Patient has normal coordination. - Respiratory:  Patient exhibits no evidence of shortness of breath or difficulty breathing. - Skin: No rashes, lesions, or other abnormalities are noted in the upper extremities.  ---   Examination of her left thumb demonstrates no swelling or tenderness along the A1 pulley.  There is no residual triggering.  She has full flexion and extension of the digits.  Provocative signs for carpal tunnel syndrome are negative.  She has intact sensation to light touch distally bilaterally along the radial, ulnar and median nerve distributions. [de-identified] : An MRI of her left wrist from 2/18/2022 demonstrated moderate to severe tendinosis and tenosynovitis of the ECU tendon.  Intrasubstance degeneration of the distal aspect of the styloid attachment of the TFCC ligament.  Mild enlargement of the median nerve consistent with carpal tunnel syndrome.\par \par Radiographs of her right hand and left wrist from 12/21/21 demonstrated no fractures or dislocations and preserved joint spaces and no joint margin erosions.

## 2023-09-08 NOTE — ASU PATIENT PROFILE, ADULT - PRO ARRIVE FROM
home Klisyri Counseling:  I discussed with the patient the risks of Klisyri including but not limited to erythema, scaling, itching, weeping, crusting, and pain.

## 2023-09-14 ENCOUNTER — APPOINTMENT (OUTPATIENT)
Dept: MAMMOGRAPHY | Facility: CLINIC | Age: 43
End: 2023-09-14
Payer: COMMERCIAL

## 2023-09-14 ENCOUNTER — RESULT REVIEW (OUTPATIENT)
Age: 43
End: 2023-09-14

## 2023-09-14 ENCOUNTER — OUTPATIENT (OUTPATIENT)
Dept: OUTPATIENT SERVICES | Facility: HOSPITAL | Age: 43
LOS: 1 days | End: 2023-09-14
Payer: COMMERCIAL

## 2023-09-14 DIAGNOSIS — Z98.890 OTHER SPECIFIED POSTPROCEDURAL STATES: Chronic | ICD-10-CM

## 2023-09-14 DIAGNOSIS — Z00.8 ENCOUNTER FOR OTHER GENERAL EXAMINATION: ICD-10-CM

## 2023-09-14 DIAGNOSIS — Z98.891 HISTORY OF UTERINE SCAR FROM PREVIOUS SURGERY: Chronic | ICD-10-CM

## 2023-09-14 DIAGNOSIS — T14.90 INJURY, UNSPECIFIED: Chronic | ICD-10-CM

## 2023-09-14 DIAGNOSIS — Z12.31 ENCOUNTER FOR SCREENING MAMMOGRAM FOR MALIGNANT NEOPLASM OF BREAST: ICD-10-CM

## 2023-09-14 PROCEDURE — 77063 BREAST TOMOSYNTHESIS BI: CPT | Mod: 26

## 2023-09-14 PROCEDURE — 77063 BREAST TOMOSYNTHESIS BI: CPT

## 2023-09-14 PROCEDURE — 77067 SCR MAMMO BI INCL CAD: CPT | Mod: 26

## 2023-09-14 PROCEDURE — 77067 SCR MAMMO BI INCL CAD: CPT

## 2023-09-16 ENCOUNTER — APPOINTMENT (OUTPATIENT)
Dept: OBGYN | Facility: CLINIC | Age: 43
End: 2023-09-16
Payer: COMMERCIAL

## 2023-09-16 VITALS
HEIGHT: 60 IN | WEIGHT: 155 LBS | DIASTOLIC BLOOD PRESSURE: 60 MMHG | BODY MASS INDEX: 30.43 KG/M2 | SYSTOLIC BLOOD PRESSURE: 112 MMHG

## 2023-09-16 PROCEDURE — 99214 OFFICE O/P EST MOD 30 MIN: CPT

## 2023-09-22 ENCOUNTER — APPOINTMENT (OUTPATIENT)
Dept: VASCULAR SURGERY | Facility: CLINIC | Age: 43
End: 2023-09-22
Payer: COMMERCIAL

## 2023-09-22 VITALS
HEART RATE: 80 BPM | HEIGHT: 60 IN | WEIGHT: 155.8 LBS | BODY MASS INDEX: 30.59 KG/M2 | DIASTOLIC BLOOD PRESSURE: 80 MMHG | OXYGEN SATURATION: 98 % | SYSTOLIC BLOOD PRESSURE: 122 MMHG

## 2023-09-22 DIAGNOSIS — I83.813 VARICOSE VEINS OF BILATERAL LOWER EXTREMITIES WITH PAIN: ICD-10-CM

## 2023-09-22 PROCEDURE — 99202 OFFICE O/P NEW SF 15 MIN: CPT

## 2023-09-22 PROCEDURE — 93970 EXTREMITY STUDY: CPT

## 2023-09-28 PROBLEM — I83.813 VARICOSE VEINS OF BOTH LOWER EXTREMITIES WITH PAIN: Status: ACTIVE | Noted: 2023-09-22

## 2023-10-09 ENCOUNTER — APPOINTMENT (OUTPATIENT)
Dept: OBGYN | Facility: CLINIC | Age: 43
End: 2023-10-09
Payer: COMMERCIAL

## 2023-10-09 PROCEDURE — 99442: CPT

## 2023-10-13 ENCOUNTER — NON-APPOINTMENT (OUTPATIENT)
Age: 43
End: 2023-10-13

## 2023-11-01 ENCOUNTER — NON-APPOINTMENT (OUTPATIENT)
Age: 43
End: 2023-11-01

## 2023-11-13 ENCOUNTER — APPOINTMENT (OUTPATIENT)
Dept: OBGYN | Facility: CLINIC | Age: 43
End: 2023-11-13
Payer: COMMERCIAL

## 2023-11-13 DIAGNOSIS — N95.1 MENOPAUSAL AND FEMALE CLIMACTERIC STATES: ICD-10-CM

## 2023-11-13 DIAGNOSIS — E28.319 ASYMPTOMATIC PREMATURE MENOPAUSE: ICD-10-CM

## 2023-11-13 PROCEDURE — 99213 OFFICE O/P EST LOW 20 MIN: CPT | Mod: 95

## 2023-11-13 RX ORDER — ESCITALOPRAM OXALATE 10 MG/1
10 TABLET ORAL DAILY
Qty: 30 | Refills: 1 | Status: DISCONTINUED | COMMUNITY
Start: 2023-09-16 | End: 2023-11-13

## 2023-11-27 RX ORDER — ESTRADIOL AND LEVONORGESTREL .045; .015 MG/D; MG/D
0.045-0.015 PATCH TRANSDERMAL
Qty: 3 | Refills: 0 | Status: DISCONTINUED | COMMUNITY
Start: 2023-11-13 | End: 2023-11-27

## 2023-12-06 ENCOUNTER — APPOINTMENT (OUTPATIENT)
Dept: ENDOCRINOLOGY | Facility: CLINIC | Age: 43
End: 2023-12-06
Payer: COMMERCIAL

## 2023-12-06 VITALS
OXYGEN SATURATION: 99 % | HEART RATE: 79 BPM | BODY MASS INDEX: 31.22 KG/M2 | SYSTOLIC BLOOD PRESSURE: 115 MMHG | HEIGHT: 60 IN | WEIGHT: 159 LBS | DIASTOLIC BLOOD PRESSURE: 77 MMHG

## 2023-12-06 DIAGNOSIS — R79.89 OTHER SPECIFIED ABNORMAL FINDINGS OF BLOOD CHEMISTRY: ICD-10-CM

## 2023-12-06 PROCEDURE — 99213 OFFICE O/P EST LOW 20 MIN: CPT

## 2023-12-06 RX ORDER — ESTRADIOL 0.1 MG/G
0.1 CREAM VAGINAL
Qty: 1 | Refills: 3 | Status: DISCONTINUED | COMMUNITY
Start: 2023-02-21 | End: 2023-12-06

## 2023-12-15 LAB
T4 FREE SERPL-MCNC: 1.2 NG/DL
THYROGLOB AB SERPL-ACNC: 51.6 IU/ML
THYROPEROXIDASE AB SERPL IA-ACNC: 90.4 IU/ML
TSH SERPL-ACNC: 1.56 UIU/ML

## 2023-12-22 ENCOUNTER — APPOINTMENT (OUTPATIENT)
Dept: VASCULAR SURGERY | Facility: CLINIC | Age: 43
End: 2023-12-22

## 2024-01-08 ENCOUNTER — APPOINTMENT (OUTPATIENT)
Dept: INTERNAL MEDICINE | Facility: CLINIC | Age: 44
End: 2024-01-08

## 2024-04-13 ENCOUNTER — RESULT CHARGE (OUTPATIENT)
Age: 44
End: 2024-04-13

## 2024-04-15 ENCOUNTER — APPOINTMENT (OUTPATIENT)
Dept: INTERNAL MEDICINE | Facility: CLINIC | Age: 44
End: 2024-04-15
Payer: COMMERCIAL

## 2024-04-15 ENCOUNTER — NON-APPOINTMENT (OUTPATIENT)
Age: 44
End: 2024-04-15

## 2024-04-15 VITALS
TEMPERATURE: 98.4 F | RESPIRATION RATE: 16 BRPM | BODY MASS INDEX: 30.28 KG/M2 | SYSTOLIC BLOOD PRESSURE: 117 MMHG | DIASTOLIC BLOOD PRESSURE: 68 MMHG | OXYGEN SATURATION: 98 % | HEIGHT: 60 IN | HEART RATE: 78 BPM | WEIGHT: 154.25 LBS

## 2024-04-15 DIAGNOSIS — Z00.00 ENCOUNTER FOR GENERAL ADULT MEDICAL EXAMINATION W/OUT ABNORMAL FINDINGS: ICD-10-CM

## 2024-04-15 DIAGNOSIS — Z23 ENCOUNTER FOR IMMUNIZATION: ICD-10-CM

## 2024-04-15 DIAGNOSIS — T30.0 BURN OF UNSPECIFIED BODY REGION, UNSPECIFIED DEGREE: ICD-10-CM

## 2024-04-15 DIAGNOSIS — N91.2 AMENORRHEA, UNSPECIFIED: ICD-10-CM

## 2024-04-15 DIAGNOSIS — E28.319 ASYMPTOMATIC PREMATURE MENOPAUSE: ICD-10-CM

## 2024-04-15 DIAGNOSIS — M25.579 PAIN IN UNSPECIFIED ANKLE AND JOINTS OF UNSPECIFIED FOOT: ICD-10-CM

## 2024-04-15 DIAGNOSIS — Z13.31 ENCOUNTER FOR SCREENING FOR DEPRESSION: ICD-10-CM

## 2024-04-15 DIAGNOSIS — M65.312 TRIGGER THUMB, LEFT THUMB: ICD-10-CM

## 2024-04-15 DIAGNOSIS — G56.02 CARPAL TUNNEL SYNDROME, LEFT UPPER LIMB: ICD-10-CM

## 2024-04-15 DIAGNOSIS — Z13.1 ENCOUNTER FOR SCREENING FOR DIABETES MELLITUS: ICD-10-CM

## 2024-04-15 PROCEDURE — 90715 TDAP VACCINE 7 YRS/> IM: CPT

## 2024-04-15 PROCEDURE — G2211 COMPLEX E/M VISIT ADD ON: CPT | Mod: NC,1L

## 2024-04-15 PROCEDURE — G0444 DEPRESSION SCREEN ANNUAL: CPT | Mod: 59

## 2024-04-15 PROCEDURE — 93000 ELECTROCARDIOGRAM COMPLETE: CPT | Mod: 59

## 2024-04-15 PROCEDURE — 90471 IMMUNIZATION ADMIN: CPT

## 2024-04-15 PROCEDURE — 99396 PREV VISIT EST AGE 40-64: CPT | Mod: 25

## 2024-04-15 PROCEDURE — 36415 COLL VENOUS BLD VENIPUNCTURE: CPT

## 2024-04-15 PROCEDURE — 99214 OFFICE O/P EST MOD 30 MIN: CPT | Mod: 25

## 2024-04-15 RX ORDER — SILVER SULFADIAZINE 10 MG/G
1 CREAM TOPICAL TWICE DAILY
Qty: 1 | Refills: 0 | Status: ACTIVE | COMMUNITY
Start: 2024-04-15 | End: 1900-01-01

## 2024-04-15 NOTE — PHYSICAL EXAM
[No Acute Distress] : no acute distress [Well Nourished] : well nourished [Well Developed] : well developed [Well-Appearing] : well-appearing [Normal Sclera/Conjunctiva] : normal sclera/conjunctiva [PERRL] : pupils equal round and reactive to light [EOMI] : extraocular movements intact [Normal Outer Ear/Nose] : the outer ears and nose were normal in appearance [Normal Oropharynx] : the oropharynx was normal [No JVD] : no jugular venous distention [No Lymphadenopathy] : no lymphadenopathy [Supple] : supple [Thyroid Normal, No Nodules] : the thyroid was normal and there were no nodules present [No Respiratory Distress] : no respiratory distress  [No Accessory Muscle Use] : no accessory muscle use [Clear to Auscultation] : lungs were clear to auscultation bilaterally [Normal Rate] : normal rate  [Regular Rhythm] : with a regular rhythm [Normal S1, S2] : normal S1 and S2 [No Murmur] : no murmur heard [No Carotid Bruits] : no carotid bruits [No Abdominal Bruit] : a ~M bruit was not heard ~T in the abdomen [No Varicosities] : no varicosities [Pedal Pulses Present] : the pedal pulses are present [No Edema] : there was no peripheral edema [No Palpable Aorta] : no palpable aorta [No Extremity Clubbing/Cyanosis] : no extremity clubbing/cyanosis [Soft] : abdomen soft [Non Tender] : non-tender [Non-distended] : non-distended [No Masses] : no abdominal mass palpated [No HSM] : no HSM [Normal Bowel Sounds] : normal bowel sounds [Normal Posterior Cervical Nodes] : no posterior cervical lymphadenopathy [Normal Anterior Cervical Nodes] : no anterior cervical lymphadenopathy [No CVA Tenderness] : no CVA  tenderness [No Spinal Tenderness] : no spinal tenderness [No Joint Swelling] : no joint swelling [Grossly Normal Strength/Tone] : grossly normal strength/tone [No Rash] : no rash [Coordination Grossly Intact] : coordination grossly intact [No Focal Deficits] : no focal deficits [Normal Gait] : normal gait [Deep Tendon Reflexes (DTR)] : deep tendon reflexes were 2+ and symmetric [Normal Affect] : the affect was normal [Normal Insight/Judgement] : insight and judgment were intact [Normal Voice/Communication] : normal voice/communication [Normal TMs] : both tympanic membranes were normal [Normal Nasal Mucosa] : the nasal mucosa was normal [No Hernias] : no hernias [Normal Supraclavicular Nodes] : no supraclavicular lymphadenopathy [Kyphosis] : no kyphosis [Scoliosis] : no scoliosis [Acne] : no acne [Speech Grossly Normal] : speech grossly normal [Memory Grossly Normal] : memory grossly normal [Alert and Oriented x3] : oriented to person, place, and time [Normal Mood] : the mood was normal [de-identified] : spider veins bilaterally lower extremities [de-identified] : done by GYN [de-identified] : overweight  [de-identified] : intermittent pain w/ flexion of L thumb [de-identified] : irregular area of discoloration w/ mild desquamation

## 2024-04-15 NOTE — ASSESSMENT
[FreeTextEntry1] : Patient is a 43 year old female with a past medical history as above who presents for an annual wellness visit.

## 2024-04-15 NOTE — PLAN
[FreeTextEntry1] : Cardiology mild hypertriglyceridemia - advised low cholesterol/low fat and low carbohydrate/low sugar diet - check FLP history of low HDL - previously recommended increasing CV exercise - recheck HDL  Endocrinology Hashimoto's Disease/weight gain - check thyroid panel - previously advised to follow up with endocrinologist, Dr. Guzman history of hyperglycemia (prediabetes) - advised low carbohydrate/low sugar diet; previously recommended increasing CV exercise - check hemoglobin A1C Vitamin D deficiency - previously advised to start Vitamin D-3 4000 IU p.o.q.d. with a meal for 1 month and then decrease dosage to 2000 IU daily thereafter - check Vitamin D  Gynecology  early menopause. Did not tolerate escitalopram or hormone replacement patches. - F/u w/ GYN for further treatment options and for repeat pap test and mammogram. Musculoskeletal L hand, intermittent pain in 1st digit w/ flexion/trigger finger-follow up with orthopedic hand specialist, Dr. Barahona right hand, 1st digit trigger finger - s/p release on 7/5/22 w/ resolution of triggering and pain bl carpal tunnel - continue to use wrist splints at night  Dermatology 1st degree burn to the R wrist-healing Rx Silvadene 1% topical cream to be applied to effected area BID sent to pharmacy - should f/u for worsening symptoms  Immunization Discussed the importance and SE of Tetanus injection and the patient has signed the consent form. Tdap 0.5 ml IM x 1 to L deltoid  Exercise Does not have a formal exercise regimen and does not engage in any form of physical activity - enjoys walking and should incorporate 1.5 hrs of walking w/ good pace into daily schedule  Podiatry  c/o of pain in bl lateral ankle/calcaneus area like due to new shoes - f/u if pain persists   check EKG (results as above), female panel, hemoglobin A1C, and thyroid panel

## 2024-04-15 NOTE — HEALTH RISK ASSESSMENT
[No] : In the past 12 months have you used drugs other than those required for medical reasons? No [0] : 2) Feeling down, depressed, or hopeless: Not at all (0) [PHQ-2 Negative - No further assessment needed] : PHQ-2 Negative - No further assessment needed [Never (0 pts)] : Never (0 points) [Audit-CScore] : 0 [[id number]] : 0 [MammogramComments] : Breast US (3/19/21): BI-RADS 2 - Benign findings.  [PapSmearDate] : 02/22 [PapSmearComments] : NILM.

## 2024-04-15 NOTE — REVIEW OF SYSTEMS
[Joint Pain] : joint pain [Negative] : Heme/Lymph [Joint Stiffness] : joint stiffness [Skin Rash] : skin rash [FreeTextEntry8] : symptoms of early menopause [FreeTextEntry9] : intermittent L1 digit pain w/ flexion; new c/o intermittent sharp/burning pain on the lateral aspect of bl ankles   [de-identified] : oil burn on R wrist

## 2024-04-16 LAB
25(OH)D3 SERPL-MCNC: 88.6 NG/ML
ALBUMIN SERPL ELPH-MCNC: 5 G/DL
ALP BLD-CCNC: 148 U/L
ALT SERPL-CCNC: 91 U/L
ANION GAP SERPL CALC-SCNC: 13 MMOL/L
AST SERPL-CCNC: 53 U/L
BASOPHILS # BLD AUTO: 0.04 K/UL
BASOPHILS NFR BLD AUTO: 0.6 %
BILIRUB SERPL-MCNC: 0.3 MG/DL
BUN SERPL-MCNC: 14 MG/DL
CALCIUM SERPL-MCNC: 9.7 MG/DL
CHLORIDE SERPL-SCNC: 102 MMOL/L
CHOLEST SERPL-MCNC: 208 MG/DL
CO2 SERPL-SCNC: 25 MMOL/L
CREAT SERPL-MCNC: 0.73 MG/DL
EGFR: 105 ML/MIN/1.73M2
EOSINOPHIL # BLD AUTO: 0.14 K/UL
EOSINOPHIL NFR BLD AUTO: 2.1 %
ESTIMATED AVERAGE GLUCOSE: 117 MG/DL
GLUCOSE SERPL-MCNC: 95 MG/DL
HBA1C MFR BLD HPLC: 5.7 %
HCT VFR BLD CALC: 43.4 %
HDLC SERPL-MCNC: 46 MG/DL
HGB BLD-MCNC: 13.7 G/DL
IMM GRANULOCYTES NFR BLD AUTO: 0.3 %
LDLC SERPL CALC-MCNC: 127 MG/DL
LYMPHOCYTES # BLD AUTO: 2.96 K/UL
LYMPHOCYTES NFR BLD AUTO: 44.4 %
MAN DIFF?: NORMAL
MCHC RBC-ENTMCNC: 28.4 PG
MCHC RBC-ENTMCNC: 31.6 GM/DL
MCV RBC AUTO: 89.9 FL
MONOCYTES # BLD AUTO: 0.46 K/UL
MONOCYTES NFR BLD AUTO: 6.9 %
NEUTROPHILS # BLD AUTO: 3.05 K/UL
NEUTROPHILS NFR BLD AUTO: 45.7 %
NONHDLC SERPL-MCNC: 162 MG/DL
PLATELET # BLD AUTO: 282 K/UL
POTASSIUM SERPL-SCNC: 4.9 MMOL/L
PROT SERPL-MCNC: 7.6 G/DL
RBC # BLD: 4.83 M/UL
RBC # FLD: 15.1 %
SODIUM SERPL-SCNC: 140 MMOL/L
TRIGL SERPL-MCNC: 200 MG/DL
TSH SERPL-ACNC: 0.81 UIU/ML
WBC # FLD AUTO: 6.67 K/UL

## 2024-04-18 ENCOUNTER — TRANSCRIPTION ENCOUNTER (OUTPATIENT)
Age: 44
End: 2024-04-18

## 2024-06-22 RX ORDER — ESTRADIOL/NORETHINDRONE ACETATE TRANSDERMAL SYSTEM .05; .14 MG/D; MG/D
0.05-0.14 PATCH, EXTENDED RELEASE TRANSDERMAL
Qty: 24 | Refills: 3 | Status: DISCONTINUED | COMMUNITY
Start: 2023-11-27 | End: 2024-04-15

## 2024-08-18 NOTE — H&P PST ADULT - NSANTHNECKRD_ENT_A_CORE
Please check with your OB about medications allowed in pregnancy.    SORE THROAT    All medications are recommended if you have been told by a provider that they are acceptable for you to take, if you are not sure, please contact your provider to check or for alternatives.      Claritin or generics at bedtime as directed  Tylenol as directed  Generic Mucinex plain blue box (400 mg guaifenesin only) as directed with lots of water for congestion and cough    Humidifier  Lots of water and tea!    Be sure to check any cold medicines for ibuprofen (Advil, Motrin) or acetaminophen (Tylenol) and do not take additional ibuprofen or acetaminophen if it is on the ingredient list.    Rest more and avoid intense physical activity until you are feeling better.  Increase clear fluids, especially if you have a fever, you will need more fluids to replenish those lost due to elevated body temperature. Warm liquids can also help to alleviate throat pain.  Advise good hand washing frequently, hand , and Chlorox/Lysol disinfectant use.    SORE THROAT RELIEF:  May take Tylenol every 4-6 hours or Ibuprofen every 6 hours.  No Aspirin if under 19 years old due to the risk of Reye’s Syndrome.  Gargle with warm salt water (1 teaspoon salt in 1 cup hot water) for sore throat/ tonsil swelling.    Please call your doctor if your symptoms are not improving or worsen over the next 2-3 days.    Go to the ER if drooling, increased fever, inability to swallow or open jaw, or difficulty breathing or talking, stiff neck, severe headache, or if new rash or rash looking like bruise.   Go directly to the ER with any severe worsening of symptoms, chest pain, shortness of breath or increased wheezing.      The Viral Experience    80-90% of sore throats, nasal symptoms and coughs, are caused by viruses. Viruses cause symptoms in many systems of the body and progress in stages. Most people recover in two weeks without treatment but some symptoms can  last longer, especially the cough, which can last for up to ONE month.    Antibiotics do not kill viruses, and are not appropriate treatment.    Common Symptoms:    Day 1: fatigue, headache, sore throat, sneezing, runny nose  Day 2: sore throat worsens, possible fever, nasal congestion  Day 3:  more nasal congestion, sinus and ear pressure, very uncomfortable and difficult to sleep.  Day 4:  mucous becomes thick and sometimes yellow, sore throat improves  Day 5-6: energy begins to improve, congestion improves  Day 7-9: some congestion still and COUGH but feeling better    Sleep, extra fluids, saline nasal spray, humidity, Vitamin C, & healthy snacks support your body as it defends itself against viral infections.    Occasionally, bacterial complication will occur near the end of a viral cold: ear infections, pneumonia, or bacterial sinus infections. These infections do respond well to antibiotics. However, 90% of sinus infections are viral and antibiotics will not help nor should be prescribed and can cause serious side effects.  For more information regarding Antibiotic use, please refer to [email or web address]      No

## 2024-08-21 NOTE — HISTORY OF PRESENT ILLNESS
Hide Include Location In Plan Question?: No Detail Level: Generalized [Family Member] : family member [FreeTextEntry1] : annual wellness visit [de-identified] : Patient is a 43-year-old female presenting for an annual wellness visit.   She has f/u w/ GYN regularly last year for symptoms of early menopause. She was placed on escitalopram and a hormone replacement patch, but d/c both due to adverse reactions. She has not been seen since she d/c the HR patch and she does not have any upcoming appointments scheduled. Her last GYN annual was 2/4/2023 w/ Dr. Otto and her last mammo was neg 9/14/2023.   Vaccinations: She has received 3 doses of the COVID vaccine, the last being about 2 year ago. She is up to date with getting the influenza vaccine. She received the 2023/2024 flu vaccine from her local pharmacy. The patient denies receiving a Tetanus vaccine over the past 10 years and is willing to get it today.   The patient still describes intermittent pain w/ flexion of L1 finger but insists that it is better than previously. Her R1 finger pain has resolved s/p surgery. She is still wearing wrist splints for bl carpal tunnel syndrome and finds them effective. She has new complaints of intermittent sharp/burning pain on the lateral aspects of bl ankles when she bends over. She denies it being chronic day long issues, but it has been going on once every few days for the past month. She denies any lumbar pain but does admit to getting new shoes over the past month that are not broken in yet. The patient reports that stretching does help the pain sometimes.  The patient is presenting in office w/ a resolving oil burn on her R wrist that she sustained about 2 weeks ago at home while cooking. She is inquiring about a topical agent she can use for it. She has been using aloe vera, coconut cream and an OTC ointment. She reports that the burn is better than it was originally.   The patient has been trying to watch her diet but states that she rarely engages in any form of physical activity.  She states that she will begin to walk for exercise since the weather is more permitting of this activity with the change in season to spring from winter.

## 2024-10-09 ENCOUNTER — APPOINTMENT (OUTPATIENT)
Dept: ORTHOPEDIC SURGERY | Facility: CLINIC | Age: 44
End: 2024-10-09
Payer: COMMERCIAL

## 2024-10-09 DIAGNOSIS — G56.02 CARPAL TUNNEL SYNDROME, LEFT UPPER LIMB: ICD-10-CM

## 2024-10-09 DIAGNOSIS — M65.312 TRIGGER THUMB, LEFT THUMB: ICD-10-CM

## 2024-10-09 DIAGNOSIS — M65.311 TRIGGER THUMB, RIGHT THUMB: ICD-10-CM

## 2024-10-09 DIAGNOSIS — G56.01 CARPAL TUNNEL SYNDROME, RIGHT UPPER LIMB: ICD-10-CM

## 2024-10-09 DIAGNOSIS — S63.592A OTHER SPECIFIED SPRAIN OF LEFT WRIST, INITIAL ENCOUNTER: ICD-10-CM

## 2024-10-09 PROCEDURE — 99214 OFFICE O/P EST MOD 30 MIN: CPT | Mod: 25

## 2024-10-09 PROCEDURE — 20550 NJX 1 TENDON SHEATH/LIGAMENT: CPT | Mod: LT

## 2024-10-09 RX ORDER — BETAMETHA AC,SOD PHOS/WATER/PF 6 MG/ML
6 (3-3) VIAL (ML) INJECTION
Qty: 1 | Refills: 0 | Status: COMPLETED | OUTPATIENT
Start: 2024-10-09

## 2024-10-09 RX ORDER — LIDOCAINE HYDROCHLORIDE 10 MG/ML
1 INJECTION, SOLUTION INFILTRATION; PERINEURAL
Refills: 0 | Status: COMPLETED | OUTPATIENT
Start: 2024-10-09

## 2024-10-09 RX ADMIN — BETAMETHASONE ACETATE AND BETAMETHASONE SODIUM PHOSPHATE 1 MG/ML: 3; 3 INJECTION, SUSPENSION INTRA-ARTICULAR; INTRALESIONAL; INTRAMUSCULAR; SOFT TISSUE at 00:00

## 2024-10-09 RX ADMIN — LIDOCAINE HYDROCHLORIDE 0.5 %: 10 INJECTION, SOLUTION INFILTRATION; PERINEURAL at 00:00

## 2024-11-02 ENCOUNTER — APPOINTMENT (OUTPATIENT)
Dept: OBGYN | Facility: CLINIC | Age: 44
End: 2024-11-02
Payer: COMMERCIAL

## 2024-11-02 VITALS
BODY MASS INDEX: 31.02 KG/M2 | SYSTOLIC BLOOD PRESSURE: 110 MMHG | DIASTOLIC BLOOD PRESSURE: 68 MMHG | HEIGHT: 60 IN | WEIGHT: 158 LBS

## 2024-11-02 DIAGNOSIS — N92.6 IRREGULAR MENSTRUATION, UNSPECIFIED: ICD-10-CM

## 2024-11-02 DIAGNOSIS — N95.1 MENOPAUSAL AND FEMALE CLIMACTERIC STATES: ICD-10-CM

## 2024-11-02 DIAGNOSIS — Z30.011 ENCOUNTER FOR INITIAL PRESCRIPTION OF CONTRACEPTIVE PILLS: ICD-10-CM

## 2024-11-02 LAB
HCG UR QL: NEGATIVE
QUALITY CONTROL: YES

## 2024-11-02 PROCEDURE — 81025 URINE PREGNANCY TEST: CPT

## 2024-11-02 PROCEDURE — 99213 OFFICE O/P EST LOW 20 MIN: CPT

## 2024-11-02 RX ORDER — NORETHINDRONE ACETATE AND ETHINYL ESTRADIOL AND FERROUS FUMARATE 1MG-20(21)
1-20 KIT ORAL
Qty: 3 | Refills: 0 | Status: ACTIVE | COMMUNITY
Start: 2024-11-02 | End: 1900-01-01

## 2024-12-30 ENCOUNTER — APPOINTMENT (OUTPATIENT)
Dept: ANTEPARTUM | Facility: CLINIC | Age: 44
End: 2024-12-30
Payer: COMMERCIAL

## 2024-12-30 ENCOUNTER — ASOB RESULT (OUTPATIENT)
Age: 44
End: 2024-12-30

## 2024-12-30 ENCOUNTER — APPOINTMENT (OUTPATIENT)
Dept: OBGYN | Facility: CLINIC | Age: 44
End: 2024-12-30
Payer: COMMERCIAL

## 2024-12-30 VITALS
SYSTOLIC BLOOD PRESSURE: 114 MMHG | BODY MASS INDEX: 31.02 KG/M2 | HEIGHT: 60 IN | WEIGHT: 158 LBS | DIASTOLIC BLOOD PRESSURE: 70 MMHG

## 2024-12-30 DIAGNOSIS — Z01.411 ENCOUNTER FOR GYNECOLOGICAL EXAMINATION (GENERAL) (ROUTINE) WITH ABNORMAL FINDINGS: ICD-10-CM

## 2024-12-30 DIAGNOSIS — N95.1 MENOPAUSAL AND FEMALE CLIMACTERIC STATES: ICD-10-CM

## 2024-12-30 DIAGNOSIS — Z12.4 ENCOUNTER FOR SCREENING FOR MALIGNANT NEOPLASM OF CERVIX: ICD-10-CM

## 2024-12-30 DIAGNOSIS — Z13.31 ENCOUNTER FOR SCREENING FOR DEPRESSION: ICD-10-CM

## 2024-12-30 DIAGNOSIS — Z13.820 ENCOUNTER FOR SCREENING FOR OSTEOPOROSIS: ICD-10-CM

## 2024-12-30 DIAGNOSIS — N94.10 UNSPECIFIED DYSPAREUNIA: ICD-10-CM

## 2024-12-30 DIAGNOSIS — E28.319 ASYMPTOMATIC PREMATURE MENOPAUSE: ICD-10-CM

## 2024-12-30 DIAGNOSIS — Z12.39 ENCOUNTER FOR OTHER SCREENING FOR MALIGNANT NEOPLASM OF BREAST: ICD-10-CM

## 2024-12-30 PROCEDURE — 99396 PREV VISIT EST AGE 40-64: CPT

## 2024-12-30 PROCEDURE — 76857 US EXAM PELVIC LIMITED: CPT | Mod: 59

## 2024-12-30 PROCEDURE — 76830 TRANSVAGINAL US NON-OB: CPT

## 2025-01-07 LAB
CYTOLOGY CVX/VAG DOC THIN PREP: NORMAL
HPV HIGH+LOW RISK DNA PNL CVX: NOT DETECTED

## 2025-02-11 ENCOUNTER — NON-APPOINTMENT (OUTPATIENT)
Age: 45
End: 2025-02-11

## 2025-02-12 ENCOUNTER — APPOINTMENT (OUTPATIENT)
Dept: ORTHOPEDIC SURGERY | Facility: CLINIC | Age: 45
End: 2025-02-12
Payer: COMMERCIAL

## 2025-02-12 DIAGNOSIS — M65.311 TRIGGER THUMB, RIGHT THUMB: ICD-10-CM

## 2025-02-12 DIAGNOSIS — M77.8 OTHER ENTHESOPATHIES, NOT ELSEWHERE CLASSIFIED: ICD-10-CM

## 2025-02-12 DIAGNOSIS — M65.312 TRIGGER THUMB, LEFT THUMB: ICD-10-CM

## 2025-02-12 DIAGNOSIS — S63.592A OTHER SPECIFIED SPRAIN OF LEFT WRIST, INITIAL ENCOUNTER: ICD-10-CM

## 2025-02-12 PROCEDURE — 99214 OFFICE O/P EST MOD 30 MIN: CPT

## 2025-02-13 ENCOUNTER — NON-APPOINTMENT (OUTPATIENT)
Age: 45
End: 2025-02-13

## 2025-02-13 NOTE — PRE PROCEDURE NOTE - PRE PROCEDURE EVALUATION
Reason For Visit       Hand & Wrist - Reason for Visit: GALE LARSON is being seen for a follow-up visit for. Bilateral hands.  ?  History of Present Illness       HPI: Greater than 4 months status post left trigger thumb cortisone injection #2. She also has bilateral carpal tunnel syndrome and I recommended carpal tunnel splints at night.  ?  She returns today, for left thumb pain. She denies any tingling and wears a glove which does provide his symptoms with relief.  ?  She is status post right trigger thumb release on 7/5/2022. She is doing well in this regard. She reports improved symptoms, with no more locking in her left trigger thumb. She reports continued bilateral hand numbness and tingling, that is alleviated with bracing.  ?  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 We previously discussed surgery for her left wrist.  ?  She is accompanied by her son today. He aided in translation from Kiswahili to English.  ?  Active Problems   1. Amenorrhea (626.0) (N91.2)   2. Ankle pain (719.47) (M25.579)   3. Breast screening (V76.10) (Z12.39)   4. Burn (949.0) (T30.0)   5. Carpal tunnel syndrome of left wrist (354.0) (G56.02)   6. Carpal tunnel syndrome of right wrist (354.0) (G56.01)   7. Cervical cancer screening (V76.2) (Z12.4)   8. Depression screening (V79.0) (Z13.31)   9. Dermatitis (692.9) (L30.9)   10. Dyspareunia in female (625.0) (N94.10)   11. Elevated alkaline phosphatase level (790.5) (R74.8)   12. Encntr for gyn exam (general) (routine) w abnormal findings (V72.31) (Z01.411)   13. Encounter for immunization (V03.89) (Z23)   14. Encounter for initial prescription of contraceptive pills (V25.01) (Z30.011)   15. Epigastric pain (789.06) (R10.13)   16. Exposure to 2019 novel coronavirus (V01.79) (Z20.822)   17. Hashimoto's disease (245.2) (E06.3)   18. History of cyst of breast (V13.3) (Z87.2)   19. Hot flushes, perimenopausal (627.2) (N95.1)   20. Hyperglycemia (790.29) (R73.9)   21. Hypertriglyceridemia (272.1) (E78.1)   22. Irregular bleeding (626.4) (N92.6)   23. Left ankle pain (719.47) (M25.572)   24. Left wrist pain (719.43) (M25.532)   25. Left wrist tendinitis (727.05) (M77.8)   26. Low TSH level (794.5) (R79.89)   27. Menopausal symptoms (627.2) (N95.1)   28. Microhematuria (599.72) (R31.29)   29. Osteoporosis screening (V82.81) (Z13.820)   30. Pain in thumb joint with movement of right hand (729.5) (M25.541)   31. Premature menopause (256.31) (E28.319)   32. Pruritic rash (698.2) (L28.2)   33. Screening for diabetes mellitus (V77.1) (Z13.1)   34. Suspected 2019 novel coronavirus infection (V01.79) (Z20.822)   35. Tear of triangular fibrocartilage complex (TFCC) of left wrist (842.00) (S63.592A)   36. Trigger finger of left thumb (727.03) (M65.312)   37. Trigger finger of right thumb (727.03) (M65.311)   38. Vaginal dryness, menopausal (627.2) (N95.1)   39. Varicose veins of both lower extremities with pain (454.8) (I83.813)   40. Vitamin D deficiency (268.9) (E55.9)   41. Weight gain (783.1) (R63.5)           ?  Past Medical History   1. Conjunctivitis, bacterial (372.39,041.9) (H10.9)   2. History of cyst of breast (V13.3) (Z87.2)   3. History of Menopausal symptoms (627.2) (N95.1)   4. History of Premature menopause (256.31) (E28.319)           Allergies   1. No Known Drug Allergies           ?  Review of Systems       Complete ROS:    Hand Dominance: right.    Neurological:. See HPI.    Constitutional, Eyes, ENT, Cardiovascular, Respiratory, Gastrointestinal, Genitourinary, Integumentary, Psychiatric, Endocrine, Heme/Lymph and Allergic/Immunologic review of systems are otherwise negative except as noted in HPI.  ?  Physical Exam       - Constitutional: This is a female in no obvious distress. She is accompanied by her son today.  - Psych: Patient is alert and oriented to person, place and time. Patient has a normal mood and affect.  - Cardiovascular: Normal pulses throughout the upper extremities. No significant varicosities are noted in the upper extremities.  - Neuro: Strength and sensation are intact throughout the upper extremities. Patient has normal coordination.  - Respiratory: Patient exhibits no evidence of shortness of breath or difficulty breathing.  - Skin: No rashes, lesions, or other abnormalities are noted in the upper extremities.  ?  ---  ?  Examination of her left thumb demonstrates swelling and tenderness along the A1 pulley. There is no obvious triggering, she cannot flex to the point of triggering. Provocative signs for carpal tunnel syndrome are negative. She is neurovascularly intact distally.    Imaging: An MRI of her left wrist from 2/18/2022 demonstrated moderate to severe tendinosis and tenosynovitis of the ECU tendon. Intrasubstance degeneration of the distal aspect of the styloid attachment of the TFCC ligament. Mild enlargement of the median nerve consistent with carpal tunnel syndrome.  ?  Radiographs of her right hand and left wrist from 12/21/21 demonstrated no fractures or dislocations and preserved joint spaces and no joint margin erosions.     ?  Pre-Surgical Assessment          Recommended PST type based on risk: PST Chart Review (low patient risk, very low procedural risk)       Surgery or procedure potential timeframe: 3 weeks    Anesthesia History: Previous Reactions to Anesthesia: none    STOP BANG Assessment: does not snore loudly, not tired, fatigued, or sleepy during the daytime, not observed to have stopped during sleep, no hypertension, not treated for high blood pressure, BMI less than 35 kG/m2, not over 50 years of age, neck circumference less than 16 inches and not male (birth sex)    STOP BANG Score: 0 - 2: Low Risk    Transfusion History - Blood Acceptance/Avoidance/Restrictions: Willing to accept blood    Previous blood transfusion: no    External/internal devices being used in treatment of patient: No devices in use in treatment    Reproductive, Female - Is patient pregnant? No, patient is not pregnant    Last Pap (date/outcome): Nov 2024    Advance Directive/Health Care Proxy: No Health Care Proxy information in chart  ?  Discussion/Summary       Narrative: I had a discussion regarding today's visit, the diagnosis and treatment recommendations and options. We also discussed changes since the last visit. At this time, I discussed treatment options of a repeat cortisone injection or surgical management. She deferred a repeat cortisone injection and has agreed to proceed with surgical release. She will speak to my surgical scheduler in regard to booking the surgery.  ?  With regard to her right carpal tunnel syndrome, as her symptoms are mild and well-controlled with the splint, I have not recommended concomitant carpal tunnel release and she is in agreement.  ?  -The nature and purposes of the operation/procedure was discussed in detail. I discussed the surgical procedure in detail, as well as expected postoperative recovery and outcome.  -Possible risks, benefits, and complications (from known and unknown causes) of the procedure were discussed in detail.  -Possible non-operative alternatives to the proposed treatment were discussed in detail.  -She was told that possible risks/complications include, but are not limited to: Infection, digital nerve or vessel injury, stiffness, painful scar, poor outcome, need for additional surgical procedures, and other unforeseen complications.  -In addition, the possibility of an "unsuccessful outcome," despite "successful surgery," was discussed with the patient.  -The patient fully understands these risks and wishes to proceed.  -I had a lengthy discussion with the patient regarding today's visit, the diagnosis, and my surgical treatment recommendations. The patient has agreed to this plan of management and has expressed full understanding. All questions were fully answered to the patient's satisfaction.  ?  My cumulative time spent on today's visit was greater than 30 minutes and included: Preparation for the visit, review of the medical records, review of pertinent diagnostic studies, examination and counseling of the patient on the above diagnosis, treatment plan and prognosis, orders of diagnostic tests, medications and/or appropriate procedures and documentation in the medical records of today's visit.     ?  Assessment   1. Left wrist tendinitis (727.05) (M77.8)   2. Tear of triangular fibrocartilage complex (TFCC) of left wrist (842.00) (S63.592A)   3. Trigger finger of left thumb (727.03) (M65.312)   4. Trigger finger of right thumb (727.03) (M65.311)           Plan   1. Surgery Booking Form Inpatient  .  Status: Need Information - Required information   Requested for: [id number]  Did the patient receive COVID 19 vaccine? : Yes, Patient stated  ERP (Enhanced Recovery Program) : No  Medical Evaluation : No  PST Triage Evaluation : PST Review  Admission Type : OP  Anesthesia Alert : N  Anesthesia Type : IVAS  Laterality : Left  Length of Procedure : 30 minutes  Procedure Description: : Left trigger thumb release           ?  End of Visit       Attestation:     This note was written by Scott Mariano on 02/12/2025 acting solely as a scribe for Dr. Kelle Barahona.  ?  All medical record entries made by the Scribe were at my, Dr. Kelle Barahona, direction and personally dictated by me on 02/12/2025. I have personally reviewed the chart and agree that the record accurately reflects my personal performance of the history, physical exam, assessment and plan.  Attesting Faculty: See Attending Electronic Signature Below  ?  Addendum       Narrative: I, Scott Mariano, acted solely as a scribe for Dr. Barahona on this date on 02/12/2025.  ?  Signatures       Electronically signed by : KELLE BARAHONA MD; Feb 12 2025 12:01PM Eastern Standard Time (Author)    ?

## 2025-02-14 ENCOUNTER — NON-APPOINTMENT (OUTPATIENT)
Age: 45
End: 2025-02-14

## 2025-02-27 ENCOUNTER — OUTPATIENT (OUTPATIENT)
Dept: OUTPATIENT SERVICES | Facility: HOSPITAL | Age: 45
LOS: 1 days | End: 2025-02-27
Payer: COMMERCIAL

## 2025-02-27 ENCOUNTER — TRANSCRIPTION ENCOUNTER (OUTPATIENT)
Age: 45
End: 2025-02-27

## 2025-02-27 ENCOUNTER — APPOINTMENT (OUTPATIENT)
Dept: ORTHOPEDIC SURGERY | Facility: HOSPITAL | Age: 45
End: 2025-02-27

## 2025-02-27 VITALS
HEIGHT: 61 IN | HEART RATE: 70 BPM | RESPIRATION RATE: 19 BRPM | WEIGHT: 157.19 LBS | DIASTOLIC BLOOD PRESSURE: 76 MMHG | SYSTOLIC BLOOD PRESSURE: 104 MMHG | OXYGEN SATURATION: 99 % | TEMPERATURE: 97 F

## 2025-02-27 VITALS
OXYGEN SATURATION: 100 % | HEART RATE: 68 BPM | SYSTOLIC BLOOD PRESSURE: 94 MMHG | DIASTOLIC BLOOD PRESSURE: 67 MMHG | RESPIRATION RATE: 20 BRPM

## 2025-02-27 DIAGNOSIS — Z98.890 OTHER SPECIFIED POSTPROCEDURAL STATES: Chronic | ICD-10-CM

## 2025-02-27 DIAGNOSIS — M65.312 TRIGGER THUMB, LEFT THUMB: ICD-10-CM

## 2025-02-27 DIAGNOSIS — Z98.891 HISTORY OF UTERINE SCAR FROM PREVIOUS SURGERY: Chronic | ICD-10-CM

## 2025-02-27 DIAGNOSIS — T14.90 INJURY, UNSPECIFIED: Chronic | ICD-10-CM

## 2025-02-27 LAB
HCG UR QL: NEGATIVE — SIGNIFICANT CHANGE UP
HCT VFR BLD CALC: 39.5 % — SIGNIFICANT CHANGE UP (ref 34.5–45)
HGB BLD-MCNC: 13.2 G/DL — SIGNIFICANT CHANGE UP (ref 11.5–15.5)
MCHC RBC-ENTMCNC: 28.4 PG — SIGNIFICANT CHANGE UP (ref 27–34)
MCHC RBC-ENTMCNC: 33.4 G/DL — SIGNIFICANT CHANGE UP (ref 32–36)
MCV RBC AUTO: 84.9 FL — SIGNIFICANT CHANGE UP (ref 80–100)
NRBC BLD AUTO-RTO: 0 /100 WBCS — SIGNIFICANT CHANGE UP (ref 0–0)
PLATELET # BLD AUTO: 266 K/UL — SIGNIFICANT CHANGE UP (ref 150–400)
RBC # BLD: 4.65 M/UL — SIGNIFICANT CHANGE UP (ref 3.8–5.2)
RBC # FLD: 13.3 % — SIGNIFICANT CHANGE UP (ref 10.3–14.5)
WBC # BLD: 6.91 K/UL — SIGNIFICANT CHANGE UP (ref 3.8–10.5)
WBC # FLD AUTO: 6.91 K/UL — SIGNIFICANT CHANGE UP (ref 3.8–10.5)

## 2025-02-27 PROCEDURE — 81025 URINE PREGNANCY TEST: CPT

## 2025-02-27 PROCEDURE — 26055 INCISE FINGER TENDON SHEATH: CPT | Mod: FA

## 2025-02-27 PROCEDURE — 85027 COMPLETE CBC AUTOMATED: CPT

## 2025-02-27 PROCEDURE — 36415 COLL VENOUS BLD VENIPUNCTURE: CPT

## 2025-02-27 PROCEDURE — ZZZZZ: CPT

## 2025-02-27 RX ORDER — ONDANSETRON HCL/PF 4 MG/2 ML
4 VIAL (ML) INJECTION ONCE
Refills: 0 | Status: DISCONTINUED | OUTPATIENT
Start: 2025-02-27 | End: 2025-02-27

## 2025-02-27 RX ORDER — OXYCODONE HYDROCHLORIDE 30 MG/1
5 TABLET ORAL ONCE
Refills: 0 | Status: DISCONTINUED | OUTPATIENT
Start: 2025-02-27 | End: 2025-02-27

## 2025-02-27 RX ORDER — HYDROMORPHONE/SOD CHLOR,ISO/PF 2 MG/10 ML
0.5 SYRINGE (ML) INJECTION
Refills: 0 | Status: DISCONTINUED | OUTPATIENT
Start: 2025-02-27 | End: 2025-02-27

## 2025-02-27 RX ORDER — APREPITANT 40 MG/1
40 CAPSULE ORAL ONCE
Refills: 0 | Status: COMPLETED | OUTPATIENT
Start: 2025-02-27 | End: 2025-02-27

## 2025-02-27 RX ORDER — IBUPROFEN 200 MG
1 TABLET ORAL
Qty: 10 | Refills: 0
Start: 2025-02-27

## 2025-02-27 RX ORDER — SODIUM CHLORIDE 9 G/1000ML
1000 INJECTION, SOLUTION INTRAVENOUS
Refills: 0 | Status: DISCONTINUED | OUTPATIENT
Start: 2025-02-27 | End: 2025-02-27

## 2025-02-27 RX ORDER — CEFAZOLIN SODIUM IN 0.9 % NACL 3 G/100 ML
2000 INTRAVENOUS SOLUTION, PIGGYBACK (ML) INTRAVENOUS ONCE
Refills: 0 | Status: COMPLETED | OUTPATIENT
Start: 2025-02-27 | End: 2025-02-27

## 2025-02-27 RX ADMIN — Medication 1 APPLICATION(S): at 06:43

## 2025-02-27 RX ADMIN — SODIUM CHLORIDE 100 MILLILITER(S): 9 INJECTION, SOLUTION INTRAVENOUS at 08:49

## 2025-02-27 RX ADMIN — APREPITANT 40 MILLIGRAM(S): 40 CAPSULE ORAL at 06:43

## 2025-02-27 NOTE — ASU PATIENT PROFILE, ADULT - FALL HARM RISK - UNIVERSAL INTERVENTIONS
Bed in lowest position, wheels locked, appropriate side rails in place/Call bell, personal items and telephone in reach/Instruct patient to call for assistance before getting out of bed or chair/Non-slip footwear when patient is out of bed/[place] to call system/Physically safe environment - no spills, clutter or unnecessary equipment/Purposeful Proactive Rounding/Room/bathroom lighting operational, light cord in reach

## 2025-02-27 NOTE — ASU DISCHARGE PLAN (ADULT/PEDIATRIC) - ASU DC SPECIAL INSTRUCTIONSFT
you may remove your dressing on monday, keep the incision clean and dry  You may get it wet if no drainage from the incision is seen

## 2025-02-27 NOTE — ASU DISCHARGE PLAN (ADULT/PEDIATRIC) - CARE PROVIDER_API CALL
Jackson Barahona)  Surgery of the Hand  [address], [address]  [place] [address]  Phone: [phone]  Fax: [phone]  Scheduled Appointment: 03/07/2025

## 2025-02-27 NOTE — ASU PATIENT PROFILE, ADULT - CENTRAL VENOUS CATHETER
A trial of Celebrex  
LVM to reschedule no show appointment with Dr Eden Diaz from 1/29/2020 however mom was at [facility] and will call us back
no

## 2025-02-27 NOTE — ASU DISCHARGE PLAN (ADULT/PEDIATRIC) - FINANCIAL ASSISTANCE
[facility] provides services at a reduced cost to those who are determined to be eligible through [facility]’s financial assistance program. Information regarding [facility]’s financial assistance program can be found by going to [email or web address] or by calling [phone].

## 2025-02-27 NOTE — ASU DISCHARGE PLAN (ADULT/PEDIATRIC) - ACTIVITY LEVEL
No exercise/No heavy lifting/No sports/gym/No weight bearing No exercise/No heavy lifting/No sports/gym/No weight bearing/Elevate extremity

## 2025-03-07 ENCOUNTER — APPOINTMENT (OUTPATIENT)
Dept: ORTHOPEDIC SURGERY | Facility: CLINIC | Age: 45
End: 2025-03-07
Payer: COMMERCIAL

## 2025-03-07 DIAGNOSIS — M65.312 TRIGGER THUMB, LEFT THUMB: ICD-10-CM

## 2025-03-07 PROCEDURE — 99024 POSTOP FOLLOW-UP VISIT: CPT

## 2025-03-14 ENCOUNTER — NON-APPOINTMENT (OUTPATIENT)
Age: 45
End: 2025-03-14

## 2025-03-20 ENCOUNTER — NON-APPOINTMENT (OUTPATIENT)
Age: 45
End: 2025-03-20

## 2025-03-28 ENCOUNTER — APPOINTMENT (OUTPATIENT)
Dept: ORTHOPEDIC SURGERY | Facility: CLINIC | Age: 45
End: 2025-03-28
Payer: COMMERCIAL

## 2025-03-28 DIAGNOSIS — M65.311 TRIGGER THUMB, RIGHT THUMB: ICD-10-CM

## 2025-03-28 PROCEDURE — 99024 POSTOP FOLLOW-UP VISIT: CPT

## 2025-06-09 ENCOUNTER — APPOINTMENT (OUTPATIENT)
Dept: RADIOLOGY | Facility: CLINIC | Age: 45
End: 2025-06-09

## 2025-06-09 ENCOUNTER — RESULT REVIEW (OUTPATIENT)
Age: 45
End: 2025-06-09

## 2025-06-09 ENCOUNTER — OUTPATIENT (OUTPATIENT)
Dept: OUTPATIENT SERVICES | Facility: HOSPITAL | Age: 45
LOS: 1 days | End: 2025-06-09
Payer: COMMERCIAL

## 2025-06-09 ENCOUNTER — APPOINTMENT (OUTPATIENT)
Dept: MAMMOGRAPHY | Facility: CLINIC | Age: 45
End: 2025-06-09

## 2025-06-09 DIAGNOSIS — Z12.39 ENCOUNTER FOR OTHER SCREENING FOR MALIGNANT NEOPLASM OF BREAST: ICD-10-CM

## 2025-06-09 DIAGNOSIS — Z98.891 HISTORY OF UTERINE SCAR FROM PREVIOUS SURGERY: Chronic | ICD-10-CM

## 2025-06-09 DIAGNOSIS — Z98.890 OTHER SPECIFIED POSTPROCEDURAL STATES: Chronic | ICD-10-CM

## 2025-06-09 DIAGNOSIS — Z00.8 ENCOUNTER FOR OTHER GENERAL EXAMINATION: ICD-10-CM

## 2025-06-09 DIAGNOSIS — T14.90 INJURY, UNSPECIFIED: Chronic | ICD-10-CM

## 2025-06-09 PROCEDURE — 77063 BREAST TOMOSYNTHESIS BI: CPT

## 2025-06-09 PROCEDURE — 77085 DXA BONE DENSITY AXL VRT FX: CPT | Mod: 26

## 2025-06-09 PROCEDURE — 77067 SCR MAMMO BI INCL CAD: CPT | Mod: 26

## 2025-06-09 PROCEDURE — 77067 SCR MAMMO BI INCL CAD: CPT

## 2025-06-09 PROCEDURE — 77063 BREAST TOMOSYNTHESIS BI: CPT | Mod: 26

## 2025-06-09 PROCEDURE — 77085 DXA BONE DENSITY AXL VRT FX: CPT

## 2025-06-23 ENCOUNTER — APPOINTMENT (OUTPATIENT)
Dept: OBGYN | Facility: CLINIC | Age: 45
End: 2025-06-23

## 2025-06-23 ENCOUNTER — ASOB RESULT (OUTPATIENT)
Age: 45
End: 2025-06-23

## 2025-06-23 ENCOUNTER — APPOINTMENT (OUTPATIENT)
Dept: ANTEPARTUM | Facility: CLINIC | Age: 45
End: 2025-06-23
Payer: COMMERCIAL

## 2025-06-23 VITALS
WEIGHT: 164.5 LBS | SYSTOLIC BLOOD PRESSURE: 100 MMHG | HEIGHT: 60 IN | BODY MASS INDEX: 32.3 KG/M2 | DIASTOLIC BLOOD PRESSURE: 68 MMHG

## 2025-06-23 PROCEDURE — 99213 OFFICE O/P EST LOW 20 MIN: CPT

## 2025-06-23 PROCEDURE — 76830 TRANSVAGINAL US NON-OB: CPT

## 2025-06-23 PROCEDURE — 76857 US EXAM PELVIC LIMITED: CPT | Mod: 59

## 2025-07-10 NOTE — H&P PST ADULT - VENOUS THROMBOEMBOLISM CURRENT LABS/TEST RESULTS
Provider Staff:  Action required for this patient    Requires labs      Please see care gap opportunities below in Care Due Message.    Thanks!  [facility]     Appointments      Date Provider   Last Visit   5/8/2025 Gricelda Madrid DO   Next Visit   Visit date not found Gricelda Madrid DO     Refill Decision Note   Shanae Dixon  is requesting a refill authorization.  Brief Assessment and Rationale for Refill:  Approve     Medication Therapy Plan:        Comments:     Note composed:4:00 AM 07/10/2025            none

## 2025-07-26 PROBLEM — D21.9 FIBROID: Status: ACTIVE | Noted: 2025-07-26

## 2025-08-11 ENCOUNTER — NON-APPOINTMENT (OUTPATIENT)
Age: 45
End: 2025-08-11

## 2025-08-12 ENCOUNTER — RESULT CHARGE (OUTPATIENT)
Age: 45
End: 2025-08-12

## 2025-08-12 ENCOUNTER — APPOINTMENT (OUTPATIENT)
Age: 45
End: 2025-08-12
Payer: COMMERCIAL

## 2025-08-12 VITALS
SYSTOLIC BLOOD PRESSURE: 111 MMHG | HEART RATE: 63 BPM | WEIGHT: 165 LBS | RESPIRATION RATE: 16 BRPM | BODY MASS INDEX: 32.39 KG/M2 | OXYGEN SATURATION: 98 % | HEIGHT: 60 IN | TEMPERATURE: 98.1 F | DIASTOLIC BLOOD PRESSURE: 74 MMHG

## 2025-08-12 DIAGNOSIS — Z00.00 ENCOUNTER FOR GENERAL ADULT MEDICAL EXAMINATION W/OUT ABNORMAL FINDINGS: ICD-10-CM

## 2025-08-12 DIAGNOSIS — E55.9 VITAMIN D DEFICIENCY, UNSPECIFIED: ICD-10-CM

## 2025-08-12 DIAGNOSIS — G56.02 CARPAL TUNNEL SYNDROME, LEFT UPPER LIMB: ICD-10-CM

## 2025-08-12 DIAGNOSIS — G56.01 CARPAL TUNNEL SYNDROME, RIGHT UPPER LIMB: ICD-10-CM

## 2025-08-12 DIAGNOSIS — R73.9 HYPERGLYCEMIA, UNSPECIFIED: ICD-10-CM

## 2025-08-12 DIAGNOSIS — R63.5 ABNORMAL WEIGHT GAIN: ICD-10-CM

## 2025-08-12 DIAGNOSIS — Z13.31 ENCOUNTER FOR SCREENING FOR DEPRESSION: ICD-10-CM

## 2025-08-12 DIAGNOSIS — E78.1 PURE HYPERGLYCERIDEMIA: ICD-10-CM

## 2025-08-12 DIAGNOSIS — Z13.1 ENCOUNTER FOR SCREENING FOR DIABETES MELLITUS: ICD-10-CM

## 2025-08-12 DIAGNOSIS — E06.3 AUTOIMMUNE THYROIDITIS: ICD-10-CM

## 2025-08-12 LAB
25(OH)D3 SERPL-MCNC: 51.8 NG/ML
ALBUMIN SERPL ELPH-MCNC: 4.6 G/DL
ALP BLD-CCNC: 144 U/L
ALT SERPL-CCNC: 105 U/L
ANION GAP SERPL CALC-SCNC: 15 MMOL/L
AST SERPL-CCNC: 71 U/L
BILIRUB SERPL-MCNC: 0.4 MG/DL
BUN SERPL-MCNC: 13 MG/DL
CALCIUM SERPL-MCNC: 9.4 MG/DL
CHLORIDE SERPL-SCNC: 100 MMOL/L
CHOLEST SERPL-MCNC: 209 MG/DL
CO2 SERPL-SCNC: 24 MMOL/L
CREAT SERPL-MCNC: 0.72 MG/DL
EGFRCR SERPLBLD CKD-EPI 2021: 106 ML/MIN/1.73M2
GLUCOSE SERPL-MCNC: 81 MG/DL
HDLC SERPL-MCNC: 42 MG/DL
LDLC SERPL-MCNC: 125 MG/DL
NONHDLC SERPL-MCNC: 168 MG/DL
POTASSIUM SERPL-SCNC: 4.3 MMOL/L
PROT SERPL-MCNC: 7.4 G/DL
SODIUM SERPL-SCNC: 139 MMOL/L
T3FREE SERPL-MCNC: 3.15 PG/ML
T4 FREE SERPL-MCNC: 0.9 NG/DL
THYROGLOB AB SERPL-ACNC: 90.6 IU/ML
THYROPEROXIDASE AB SERPL IA-ACNC: 18.9 IU/ML
TRIGL SERPL-MCNC: 241 MG/DL
TSH SERPL-ACNC: 1.64 UIU/ML

## 2025-08-12 PROCEDURE — 93000 ELECTROCARDIOGRAM COMPLETE: CPT | Mod: 59

## 2025-08-12 PROCEDURE — G0444 DEPRESSION SCREEN ANNUAL: CPT | Mod: 59

## 2025-08-12 PROCEDURE — 99396 PREV VISIT EST AGE 40-64: CPT

## 2025-08-12 PROCEDURE — 36415 COLL VENOUS BLD VENIPUNCTURE: CPT

## 2025-08-13 LAB
BASOPHILS # BLD AUTO: 0.05 K/UL
BASOPHILS NFR BLD AUTO: 0.6 %
EOSINOPHIL # BLD AUTO: 0.13 K/UL
EOSINOPHIL NFR BLD AUTO: 1.5 %
ESTIMATED AVERAGE GLUCOSE: 120 MG/DL
HBA1C MFR BLD HPLC: 5.8 %
HCT VFR BLD CALC: 41.2 %
HGB BLD-MCNC: 13.3 G/DL
IMM GRANULOCYTES NFR BLD AUTO: 0.2 %
LYMPHOCYTES # BLD AUTO: 3.43 K/UL
LYMPHOCYTES NFR BLD AUTO: 40.2 %
MAN DIFF?: NORMAL
MCHC RBC-ENTMCNC: 28.1 PG
MCHC RBC-ENTMCNC: 32.3 G/DL
MCV RBC AUTO: 87.1 FL
MONOCYTES # BLD AUTO: 0.53 K/UL
MONOCYTES NFR BLD AUTO: 6.2 %
NEUTROPHILS # BLD AUTO: 4.37 K/UL
NEUTROPHILS NFR BLD AUTO: 51.3 %
PLATELET # BLD AUTO: 259 K/UL
RBC # BLD: 4.73 M/UL
RBC # FLD: 14.1 %
WBC # FLD AUTO: 8.53 K/UL

## 2025-08-19 ENCOUNTER — NON-APPOINTMENT (OUTPATIENT)
Age: 45
End: 2025-08-19

## 2025-08-27 ENCOUNTER — APPOINTMENT (OUTPATIENT)
Dept: ORTHOPEDIC SURGERY | Facility: CLINIC | Age: 45
End: 2025-08-27
Payer: COMMERCIAL

## 2025-08-27 DIAGNOSIS — M65.312 TRIGGER THUMB, LEFT THUMB: ICD-10-CM

## 2025-08-27 DIAGNOSIS — G56.03 CARPAL TUNNEL SYNDROM,BILATERAL UPPER LIMBS: ICD-10-CM

## 2025-08-27 PROCEDURE — 99214 OFFICE O/P EST MOD 30 MIN: CPT

## (undated) DEVICE — BLANKET WARMER FULL ADULT

## (undated) DEVICE — POSITIONER FOAM HEAD CRADLE (PINK)

## (undated) DEVICE — SPECIMEN CONTAINER PET

## (undated) DEVICE — SUT MONOCRYL 5-0 18" P-3 UNDYED

## (undated) DEVICE — WRAP COMPRESSION CALF MED

## (undated) DEVICE — DRAPE TOWEL BLUE 17" X 24"

## (undated) DEVICE — GLV 7.5 PROTEXIS (BLUE)

## (undated) DEVICE — TOURNIQUET CUFF 18" DUAL PORT SINGLE BLADDER W PLC  (BLACK)

## (undated) DEVICE — SOL IRR POUR NS 0.9% 500ML

## (undated) DEVICE — SOL IRR POUR H2O 1500ML

## (undated) DEVICE — DRAPE 3/4 SHEET 52X76"

## (undated) DEVICE — PACK UPPER EXTREMITY

## (undated) DEVICE — POSITIONER FOAM HEAD CRADLE

## (undated) DEVICE — SUT MONOSOF 5-0 18" P-12

## (undated) DEVICE — DRSG WEBRIL 3"

## (undated) DEVICE — GLV 7 PROTEXIS (WHITE)

## (undated) DEVICE — POSITIONER STRAP ARMBOARD VELCRO TS-30

## (undated) DEVICE — POSITIONER STRAP ARMBOARD VELCRO TS-30 12

## (undated) DEVICE — WARMING BLANKET FULL ADULT

## (undated) DEVICE — DRSG KLING 4"

## (undated) DEVICE — DRSG ACE BANDAGE 4"

## (undated) DEVICE — NDL HYPO REGULAR BEVEL 25G X 1.5"

## (undated) DEVICE — NDL HYPO REGULAR BEVEL 25G X 1.5" (BLUE)

## (undated) DEVICE — DRAPE SURGICAL #1010

## (undated) DEVICE — CUFF TOURNIQUET 18" DUAL PORT W PLC

## (undated) DEVICE — GLV 7.5 ESTEEM BLUE

## (undated) DEVICE — DRSG ESMARK 4"

## (undated) DEVICE — DRSG XEROFORM 1 X 8"

## (undated) DEVICE — CONTAINER SPECIMEN PET

## (undated) DEVICE — GLV 7 PROTEXIS

## (undated) DEVICE — TOURNIQUET ESMARK 4"

## (undated) DEVICE — VENODYNE/SCD SLEEVE CALF MEDIUM